# Patient Record
Sex: MALE | Race: WHITE | NOT HISPANIC OR LATINO | Employment: FULL TIME | URBAN - METROPOLITAN AREA
[De-identification: names, ages, dates, MRNs, and addresses within clinical notes are randomized per-mention and may not be internally consistent; named-entity substitution may affect disease eponyms.]

---

## 2017-02-15 ENCOUNTER — TRANSCRIBE ORDERS (OUTPATIENT)
Dept: ADMINISTRATIVE | Facility: HOSPITAL | Age: 47
End: 2017-02-15

## 2017-02-15 DIAGNOSIS — R06.81 APNEA: Primary | ICD-10-CM

## 2017-04-10 ENCOUNTER — HOSPITAL ENCOUNTER (OUTPATIENT)
Dept: SLEEP CENTER | Facility: CLINIC | Age: 47
Discharge: HOME | End: 2017-04-10
Payer: COMMERCIAL

## 2017-04-10 DIAGNOSIS — R06.81 APNEA: ICD-10-CM

## 2017-09-05 ENCOUNTER — APPOINTMENT (EMERGENCY)
Dept: CT IMAGING | Facility: HOSPITAL | Age: 47
End: 2017-09-05
Payer: COMMERCIAL

## 2017-09-05 ENCOUNTER — HOSPITAL ENCOUNTER (EMERGENCY)
Facility: HOSPITAL | Age: 47
Discharge: HOME | End: 2017-09-05
Attending: EMERGENCY MEDICINE | Admitting: EMERGENCY MEDICINE
Payer: COMMERCIAL

## 2017-09-05 VITALS
SYSTOLIC BLOOD PRESSURE: 141 MMHG | TEMPERATURE: 98.1 F | RESPIRATION RATE: 16 BRPM | OXYGEN SATURATION: 98 % | HEART RATE: 57 BPM | DIASTOLIC BLOOD PRESSURE: 94 MMHG

## 2017-09-05 DIAGNOSIS — H53.419 SCOTOMA: Primary | ICD-10-CM

## 2017-09-05 LAB
ALBUMIN SERPL BCP-MCNC: 3.7 G/DL (ref 3.5–5)
ALP SERPL-CCNC: 65 U/L (ref 46–116)
ALT SERPL W P-5'-P-CCNC: 27 U/L (ref 12–78)
ANION GAP SERPL CALCULATED.3IONS-SCNC: 10 MMOL/L (ref 4–13)
APTT PPP: 30 SECONDS (ref 23–35)
AST SERPL W P-5'-P-CCNC: 45 U/L (ref 5–45)
BASOPHILS # BLD AUTO: 0.06 THOUSANDS/ΜL (ref 0–0.1)
BASOPHILS NFR BLD AUTO: 1 % (ref 0–1)
BILIRUB SERPL-MCNC: 0.3 MG/DL (ref 0.2–1)
BUN SERPL-MCNC: 14 MG/DL (ref 5–25)
CALCIUM SERPL-MCNC: 8.6 MG/DL (ref 8.3–10.1)
CHLORIDE SERPL-SCNC: 105 MMOL/L (ref 100–108)
CO2 SERPL-SCNC: 30 MMOL/L (ref 21–32)
CREAT SERPL-MCNC: 0.81 MG/DL (ref 0.6–1.3)
EOSINOPHIL # BLD AUTO: 0.17 THOUSAND/ΜL (ref 0–0.61)
EOSINOPHIL NFR BLD AUTO: 3 % (ref 0–6)
ERYTHROCYTE [DISTWIDTH] IN BLOOD BY AUTOMATED COUNT: 13.3 % (ref 11.6–15.1)
GFR SERPL CREATININE-BSD FRML MDRD: 107 ML/MIN/1.73SQ M
GLUCOSE SERPL-MCNC: 105 MG/DL (ref 65–140)
HCT VFR BLD AUTO: 44.4 % (ref 36.5–49.3)
HGB BLD-MCNC: 14.7 G/DL (ref 12–17)
INR PPP: 0.94 (ref 0.86–1.16)
LYMPHOCYTES # BLD AUTO: 1.92 THOUSANDS/ΜL (ref 0.6–4.47)
LYMPHOCYTES NFR BLD AUTO: 31 % (ref 14–44)
MAGNESIUM SERPL-MCNC: 2.2 MG/DL (ref 1.6–2.6)
MCH RBC QN AUTO: 29.5 PG (ref 26.8–34.3)
MCHC RBC AUTO-ENTMCNC: 33.1 G/DL (ref 31.4–37.4)
MCV RBC AUTO: 89 FL (ref 82–98)
MONOCYTES # BLD AUTO: 0.56 THOUSAND/ΜL (ref 0.17–1.22)
MONOCYTES NFR BLD AUTO: 9 % (ref 4–12)
NEUTROPHILS # BLD AUTO: 3.43 THOUSANDS/ΜL (ref 1.85–7.62)
NEUTS SEG NFR BLD AUTO: 56 % (ref 43–75)
NT-PROBNP SERPL-MCNC: 84 PG/ML
PLATELET # BLD AUTO: 229 THOUSANDS/UL (ref 149–390)
PMV BLD AUTO: 9.7 FL (ref 8.9–12.7)
POTASSIUM SERPL-SCNC: 3.6 MMOL/L (ref 3.5–5.3)
PROT SERPL-MCNC: 7 G/DL (ref 6.4–8.2)
PROTHROMBIN TIME: 12.8 SECONDS (ref 12.1–14.4)
RBC # BLD AUTO: 4.99 MILLION/UL (ref 3.88–5.62)
SODIUM SERPL-SCNC: 145 MMOL/L (ref 136–145)
TROPONIN I SERPL-MCNC: <0.02 NG/ML
WBC # BLD AUTO: 6.14 THOUSAND/UL (ref 4.31–10.16)

## 2017-09-05 PROCEDURE — 99284 EMERGENCY DEPT VISIT MOD MDM: CPT

## 2017-09-05 PROCEDURE — 36415 COLL VENOUS BLD VENIPUNCTURE: CPT | Performed by: EMERGENCY MEDICINE

## 2017-09-05 PROCEDURE — 83735 ASSAY OF MAGNESIUM: CPT | Performed by: EMERGENCY MEDICINE

## 2017-09-05 PROCEDURE — 85025 COMPLETE CBC W/AUTO DIFF WBC: CPT | Performed by: EMERGENCY MEDICINE

## 2017-09-05 PROCEDURE — 85730 THROMBOPLASTIN TIME PARTIAL: CPT | Performed by: EMERGENCY MEDICINE

## 2017-09-05 PROCEDURE — 70450 CT HEAD/BRAIN W/O DYE: CPT

## 2017-09-05 PROCEDURE — 85610 PROTHROMBIN TIME: CPT | Performed by: EMERGENCY MEDICINE

## 2017-09-05 PROCEDURE — 80053 COMPREHEN METABOLIC PANEL: CPT | Performed by: EMERGENCY MEDICINE

## 2017-09-05 PROCEDURE — 93005 ELECTROCARDIOGRAM TRACING: CPT | Performed by: EMERGENCY MEDICINE

## 2017-09-05 PROCEDURE — 84484 ASSAY OF TROPONIN QUANT: CPT | Performed by: EMERGENCY MEDICINE

## 2017-09-05 PROCEDURE — 83880 ASSAY OF NATRIURETIC PEPTIDE: CPT | Performed by: EMERGENCY MEDICINE

## 2017-09-06 LAB
ATRIAL RATE: 55 BPM
P AXIS: 67 DEGREES
PR INTERVAL: 176 MS
QRS AXIS: 2 DEGREES
QRSD INTERVAL: 86 MS
QT INTERVAL: 430 MS
QTC INTERVAL: 411 MS
T WAVE AXIS: 32 DEGREES
VENTRICULAR RATE: 55 BPM

## 2017-12-12 ENCOUNTER — TRANSCRIBE ORDERS (OUTPATIENT)
Dept: ADMINISTRATIVE | Facility: HOSPITAL | Age: 47
End: 2017-12-12

## 2017-12-12 ENCOUNTER — ALLSCRIPTS OFFICE VISIT (OUTPATIENT)
Dept: OTHER | Facility: OTHER | Age: 47
End: 2017-12-12

## 2017-12-12 DIAGNOSIS — H53.419 SCOTOMA INVOLVING CENTRAL AREA IN VISUAL FIELD, UNSPECIFIED LATERALITY: Primary | ICD-10-CM

## 2017-12-12 DIAGNOSIS — H53.419: ICD-10-CM

## 2017-12-29 ENCOUNTER — LAB CONVERSION - ENCOUNTER (OUTPATIENT)
Dept: OTHER | Facility: OTHER | Age: 47
End: 2017-12-29

## 2017-12-29 LAB
C-REACTIVE PROTEIN (HISTORICAL): 1.2 MG/L
ERYTHROCYTE SEDIMENTATION RATE (HISTORICAL): 2 MM/H

## 2018-01-24 VITALS
WEIGHT: 200.56 LBS | RESPIRATION RATE: 16 BRPM | BODY MASS INDEX: 26.58 KG/M2 | HEIGHT: 73 IN | SYSTOLIC BLOOD PRESSURE: 126 MMHG | OXYGEN SATURATION: 97 % | HEART RATE: 67 BPM | DIASTOLIC BLOOD PRESSURE: 78 MMHG

## 2018-04-06 ENCOUNTER — TELEPHONE (OUTPATIENT)
Dept: NEUROLOGY | Facility: CLINIC | Age: 48
End: 2018-04-06

## 2018-04-06 NOTE — TELEPHONE ENCOUNTER
So I am looking at his chart - and actually MRI brain, and CTs have even been done  That's fine he does not need to see me unless he has more concerns/questions

## 2018-04-06 NOTE — TELEPHONE ENCOUNTER
pt called to review his testing that was done in dec   he states that due to insurance it would cost him $500 to have a follow up appt with you   he is asking if you need to see him for follow up or if we can give him the results on the phone  MRI and 2 CT's were done and results in allscripts     [phone] to leave detailed message on machine

## 2018-04-24 ENCOUNTER — TELEPHONE (OUTPATIENT)
Dept: UROLOGY | Facility: HOSPITAL | Age: 48
End: 2018-04-24

## 2018-04-24 NOTE — TELEPHONE ENCOUNTER
Dr Holly Mercedes has an opening tomorrow in [facility] at 6:45 pm   Please offer to patient

## 2018-04-24 NOTE — TELEPHONE ENCOUNTER
Patient accepted appt of 04/25/18 at 6:45PM with Dr Geovanni Horan at [facility] Comment

## 2018-04-24 NOTE — TELEPHONE ENCOUNTER
Please triage  Patient has a lump on his penis and is in significant pain  It has also changed the curvature of the penis  He would like to be seen in [facility]

## 2018-09-14 ENCOUNTER — OFFICE VISIT (OUTPATIENT)
Dept: PULMONOLOGY | Facility: MEDICAL CENTER | Age: 48
End: 2018-09-14
Payer: COMMERCIAL

## 2018-09-14 VITALS
HEART RATE: 57 BPM | TEMPERATURE: 98.2 F | SYSTOLIC BLOOD PRESSURE: 128 MMHG | BODY MASS INDEX: 25.54 KG/M2 | RESPIRATION RATE: 12 BRPM | DIASTOLIC BLOOD PRESSURE: 82 MMHG | WEIGHT: 199 LBS | OXYGEN SATURATION: 98 % | HEIGHT: 74 IN

## 2018-09-14 DIAGNOSIS — G47.33 OSA (OBSTRUCTIVE SLEEP APNEA): Primary | ICD-10-CM

## 2018-09-14 PROCEDURE — 99203 OFFICE O/P NEW LOW 30 MIN: CPT | Performed by: INTERNAL MEDICINE

## 2018-09-14 NOTE — PROGRESS NOTES
Assessment/Plan:     Problem List Items Addressed This Visit        Respiratory    BRIAN (obstructive sleep apnea) - Primary     Upon review of data patient does have slightly elevated AHI using the new machine  Unclear if this is related to machine variation and increased sensitivity of the newer machines  Patient may possibly require an increase in the base pressure  Central apnea index is normal based on compliance download  Periodic mask, tubing, filter replacement every 1-3 months is advised and periodic  approximately every year is advised  Uses CPAP during all sleep periods is discussed  Avoidance of sedatives, supine sleep, alcohol, narcotics in the setting of untreated sleep apnea is discussed  Absolute avoidance of driving while drowsy is also discussed  Avoid weight gain/encourage weight loss  Counseling / Coordination of Care  Total floor / unit time spent today 40  minutes  Greater than 50% of total time was spent with the patient and / or family counseling and / or coordination of care  A description of the counseling / coordination of care: Including but not limited to Discussion with medical equipment representative, obtaining compliance data      All questions are answered to the patient's satisfaction and understanding  He verbalizes understanding  He is encouraged to call with any further questions or concerns  Portions of the record may have been created with voice recognition software  Occasional wrong word or "sound a like" substitutions may have occurred due to the inherent limitations of voice recognition software  Read the chart carefully and recognize, using context, where substitutions have occurred      Electronically Signed by Amy Emerson MD    ______________________________________________________________________    Chief Complaint:   Chief Complaint   Patient presents with    Consult     pt has had machine for  4 yrs  and had sleep studies in [place]   Sleep Apnea     ahI number has changed and pt would like machine checked out   Sleeping Problem     none       Patient ID: Byron Johnson is a 53 y o  y o  male has a past medical history of Scotoma and Sleep apnea  9/14/2018  Patient presents today for initial visit  Diagnosed with BRIAN 5 years due to snoring and witnessed apneas  He has never had daytime sleepiness  Replaced machine recently because humidifier wasn't working  He bought his own machine  4 months ago  Settings are at Auto 8-12 cm water  Has a Dreamstation  Mostly recently his AHI has been elevated- after he got the new machine- 4 5- 10  He humidifier on the machine  Uses wisp mask- no leak  He changes his supplies- regularly  No current symptoms  Hours of sleep around 6 hours  Takes it off and sleeps another 1-2 hours  6-8 pounds increase in weight  Patient denies the use of any narcotics, sedatives  No history of any traumatic brain injury  +postnasal drip  Review of Systems   All other systems reviewed and are negative  Smoking history: He reports that he has never smoked  He has never used smokeless tobacco       There is no immunization history on file for this patient  No current outpatient prescriptions on file  No current facility-administered medications for this visit  Allergies: Patient has no known allergies  Objective:  Vitals:    09/14/18 0808   BP: 128/82   BP Location: Right arm   Patient Position: Sitting   Cuff Size: Large   Pulse: 57   Resp: 12   Temp: 98 2 °F (36 8 °C)   TempSrc: Oral   SpO2: 98%   Weight: 90 3 kg (199 lb)   Height: 6' 2" (1 88 m)   Oxygen Therapy  SpO2: 98 %    Wt Readings from Last 3 Encounters:   09/14/18 90 3 kg (199 lb)   12/12/17 91 kg (200 lb 8 9 oz)   09/03/13 89 4 kg (197 lb)     Body mass index is 25 55 kg/m²  Physical Exam   Constitutional: He is oriented to person, place, and time  He appears well-developed and well-nourished   No distress  HENT:   Head: Normocephalic and atraumatic  Mouth/Throat: Oropharynx is clear and moist  No oropharyngeal exudate  Mallampati 4   Eyes: EOM are normal  Pupils are equal, round, and reactive to light  Neck: Normal range of motion  Neck supple  No tracheal deviation present  No thyromegaly present  Neck circumference 16   Cardiovascular: Normal rate and regular rhythm  No murmur heard  Pulmonary/Chest: Effort normal and breath sounds normal  No respiratory distress  He has no wheezes  He has no rales  He exhibits no tenderness  Abdominal: Soft  Bowel sounds are normal  He exhibits no distension  There is no tenderness  Musculoskeletal: Normal range of motion  He exhibits no edema  Lymphadenopathy:     He has no cervical adenopathy  Neurological: He is alert and oriented to person, place, and time  No cranial nerve deficit  Skin: Skin is warm and dry  He is not diaphoretic  Psychiatric: He has a normal mood and affect  His behavior is normal    Vitals reviewed  Diagnostics:    Patient had diagnostic home sleep study in 2014 that showed AHI of 27/hour  This was performed at the [facility]  He subsequently had a titration study that showed optimal pressure of CPAP 11 cm water  ESS: Total score: 1    Compliance data from both machines are reviewed in detail  Patient does have some central apneas and index is 1 7 on his new machine  Overall AHI is 6 0  This is from data that ranged from 5/8/2018 to 9/14/2018  Minimal leak is noted  Patient has and auto peak pressure of 9 4  Mean pressure of 8   5     On his old machine compliance data from 5/8/2017 to 5/7/2018 patient's AHI on this machine was 3 9/hour  0 sec in leak  Auto CPAP peak pressure of 10 7 cm water  Mean pressure of 8 4 cm water

## 2018-09-14 NOTE — LETTER
September 17, 2018     [address]  [address]  [address] [place] [address]    Patient: Gabby Anderson   YOB: 1970   Date of Visit: 9/14/2018       Dear Dr Palacios Poster: Thank you for referring Alisia Parra to me for evaluation  Below are my notes for this consultation  If you have questions, please do not hesitate to call me  I look forward to following your patient along with you  Sincerely,        Dena Chu MD        CC: No Recipients  Dena Chu MD  9/17/2018 10:54 AM  Sign at close encounter  Assessment/Plan:     Problem List Items Addressed This Visit        Respiratory    BRIAN (obstructive sleep apnea) - Primary     Upon review of data patient does have slightly elevated AHI using the new machine  Unclear if this is related to machine variation and increased sensitivity of the newer machines  Patient may possibly require an increase in the base pressure  Central apnea index is normal based on compliance download  Periodic mask, tubing, filter replacement every 1-3 months is advised and periodic  approximately every year is advised  Uses CPAP during all sleep periods is discussed  Avoidance of sedatives, supine sleep, alcohol, narcotics in the setting of untreated sleep apnea is discussed  Absolute avoidance of driving while drowsy is also discussed  Avoid weight gain/encourage weight loss  Counseling / Coordination of Care  Total floor / unit time spent today 40  minutes  Greater than 50% of total time was spent with the patient and / or family counseling and / or coordination of care  A description of the counseling / coordination of care: Including but not limited to Discussion with medical equipment representative, obtaining compliance data      All questions are answered to the patient's satisfaction and understanding  He verbalizes understanding    He is encouraged to call with any further questions or concerns  Portions of the record may have been created with voice recognition software  Occasional wrong word or "sound a like" substitutions may have occurred due to the inherent limitations of voice recognition software  Read the chart carefully and recognize, using context, where substitutions have occurred  Electronically Signed by Alan Weber MD    ______________________________________________________________________    Chief Complaint:   Chief Complaint   Patient presents with    Consult     pt has had machine for  4 yrs  and had sleep studies in [place]   Sleep Apnea     ahI number has changed and pt would like machine checked out   Sleeping Problem     none       Patient ID: Len Middleton is a 53 y o  y o  male has a past medical history of Scot[place] and Sleep apnea  9/14/2018  Patient presents today for initial visit  Diagnosed with BRIAN 5 years due to snoring and witnessed apneas  He has never had daytime sleepiness  Replaced machine recently because humidifier wasn't working  He bought his own machine  4 months ago  Settings are at Auto 8-12 cm water  Has a Dreamstation  Mostly recently his AHI has been elevated- after he got the new machine- 4 5- 10  He humidifier on the machine  Uses wisp mask- no leak  He changes his supplies- regularly  No current symptoms  Hours of sleep around 6 hours  Takes it off and sleeps another 1-2 hours  6-8 pounds increase in weight  Patient denies the use of any narcotics, sedatives  No history of any traumatic brain injury  +postnasal drip  Review of Systems   All other systems reviewed and are negative  Smoking history: He reports that he has never smoked  He has never used smokeless tobacco       There is no immunization history on file for this patient  No current outpatient prescriptions on file  No current facility-administered medications for this visit  Allergies: Patient has no known allergies      Objective:  Vitals: 09/14/18 0808   BP: 128/82   BP Location: Right arm   Patient Position: Sitting   Cuff Size: Large   Pulse: 57   Resp: 12   Temp: 98 2 °F (36 8 °C)   TempSrc: Oral   SpO2: 98%   Weight: 90 3 kg (199 lb)   Height: 6' 2" (1 88 m)   Oxygen Therapy  SpO2: 98 %    Wt Readings from Last 3 Encounters:   09/14/18 90 3 kg (199 lb)   12/12/17 91 kg (200 lb 8 9 oz)   09/03/13 89 4 kg (197 lb)     Body mass index is 25 55 kg/m²  Physical Exam   Constitutional: He is oriented to person, place, and time  He appears well-developed and well-nourished  No distress  HENT:   Head: Normocephalic and atraumatic  Mouth/Throat: Oropharynx is clear and moist  No oropharyngeal exudate  Mallampati 4   Eyes: EOM are normal  Pupils are equal, round, and reactive to light  Neck: Normal range of motion  Neck supple  No tracheal deviation present  No thyromegaly present  Neck circumference 16   Cardiovascular: Normal rate and regular rhythm  No murmur heard  Pulmonary/Chest: Effort normal and breath sounds normal  No respiratory distress  He has no wheezes  He has no rales  He exhibits no tenderness  Abdominal: Soft  Bowel sounds are normal  He exhibits no distension  There is no tenderness  Musculoskeletal: Normal range of motion  He exhibits no edema  Lymphadenopathy:     He has no cervical adenopathy  Neurological: He is alert and oriented to person, place, and time  No cranial nerve deficit  Skin: Skin is warm and dry  He is not diaphoretic  Psychiatric: He has a normal mood and affect  His behavior is normal    Vitals reviewed  Diagnostics:    Patient had diagnostic home sleep study in 2014 that showed AHI of 27/hour  [facility] was [facility] at [facility]  He subsequently had a titration study that showed optimal pressure of CPAP 11 cm water  ESS: Total score: 1    Compliance data from both machines are reviewed in detail    Patient does have some central apneas and index is 1 7 on his new machine  Overall AHI is 6 0  This is from data that ranged from 5/8/2018 to 9/14/2018  Minimal leak is noted  Patient has and auto peak pressure of 9 4  Mean pressure of 8   5     On his old machine compliance data from 5/8/2017 to 5/7/2018 patient's AHI on this machine was 3 9/hour  0 sec in leak  Auto CPAP peak pressure of 10 7 cm water  Mean pressure of 8 4 cm water

## 2018-09-17 PROBLEM — G47.33 OSA (OBSTRUCTIVE SLEEP APNEA): Status: ACTIVE | Noted: 2018-09-17

## 2018-09-21 ENCOUNTER — TELEPHONE (OUTPATIENT)
Dept: PULMONOLOGY | Facility: MEDICAL CENTER | Age: 48
End: 2018-09-21

## 2018-09-21 NOTE — TELEPHONE ENCOUNTER
Patient calling you back  Said you have the chip for his cpap and was wondering if you were able to get the download    Please call him back

## 2020-01-28 ENCOUNTER — TELEPHONE (OUTPATIENT)
Dept: UROLOGY | Facility: MEDICAL CENTER | Age: 50
End: 2020-01-28

## 2020-01-28 NOTE — TELEPHONE ENCOUNTER
This is a new patient and wants to know if we have a doctor that specialized in Pyeronnies disease  He would go to any office if a certain doctor specializes  Please call patient back at [phone]

## 2020-01-28 NOTE — TELEPHONE ENCOUNTER
Complaint/diagnosis:  New pt peyronie disease  Insurance:  AirPOS ID [id number]  History of Cancer:  no  Previous Urologist:  ervin urlogy  Outside testing/where:  no  what kind of test:  no  Records requested/where:  no  Preferred Location:   [facility]

## 2020-01-28 NOTE — TELEPHONE ENCOUNTER
Called and left message for patient to give office a call back to schedule appointment  When patient calls back, may be scheduled 02/05/20 at any open slot with Dr Johnny Holliday

## 2020-02-05 ENCOUNTER — CONSULT (OUTPATIENT)
Dept: UROLOGY | Facility: MEDICAL CENTER | Age: 50
End: 2020-02-05
Payer: COMMERCIAL

## 2020-02-05 VITALS
BODY MASS INDEX: 25.93 KG/M2 | SYSTOLIC BLOOD PRESSURE: 102 MMHG | DIASTOLIC BLOOD PRESSURE: 76 MMHG | HEIGHT: 74 IN | WEIGHT: 202 LBS | HEART RATE: 64 BPM

## 2020-02-05 DIAGNOSIS — N48.6 PEYRONIE'S DISEASE: Primary | ICD-10-CM

## 2020-02-05 PROCEDURE — 99244 OFF/OP CNSLTJ NEW/EST MOD 40: CPT | Performed by: UROLOGY

## 2020-02-05 NOTE — PROGRESS NOTES
Assessment/Plan:  Peyronie's disease - patient notes a multiple year history of a Peyronie's plaque with the development of penile curvature over the past 18 months with plaque stability in curvature stability attained for the past year  Patient denies pain with erection but does note some tenderness after sexual intercourse in the area plaque  The patient is interested in proceeding with Xiaflex injections and will return for alprostadil injection to induce an artificial erection and measure the angle / degree of curvature as well as the point of maximal concavity  The patient is aware the possibility of hematoma ecchymosis infection penile rupture  He understands that with Xiaflex injections the curvature may worsen as well  No problem-specific Assessment & Plan notes found for this encounter  Diagnoses and all orders for this visit:    Peyronie's disease  -     PROSTAGLANDIN PGE1 INJECTABLE 20 MCG/ML, STANDARD, IJ SOLN; 1 mL by Intracavernosal route once for 1 dose          Subjective:      Patient ID: Maurilio Day is a 51 y o  male  HPI    43-year-old gentleman who notes a multiple year history of a Peyronie's plaque with development of Peyronie's curvature approximately 30-35 degrees dorsally from the distal penile shaft  He notes no sentinel event trauma that may have led to Peyronie's curvature  He presents to discuss treatment  He denies any erectile dysfunction or urinary problems noting no gross hematuria or dysuria in the past   He does note a remote history "chronic prostatitis" with the symptoms having resolved over time  The following portions of the patient's history were reviewed and updated as appropriate: allergies, current medications, past family history, past medical history, past social history, past surgical history and problem list     Review of Systems   All other systems reviewed and are negative          Objective:      /76   Pulse 64   Ht 6' 2" (1 88 m)   Wt 91 6 kg (202 lb)   BMI 25 94 kg/m²          Physical Exam   Constitutional: He is oriented to person, place, and time  He appears well-developed and well-nourished  No distress  HENT:   Head: Atraumatic  Eyes: EOM are normal    Neck: Neck supple  Cardiovascular: Normal rate  Pulmonary/Chest: Effort normal    Abdominal: Soft  Genitourinary:   Genitourinary Comments: Phallus circumcised without cutaneous lesions  There is a Peyronie's plaque in the distal dorsal penile shaft  Testes adnexa and scrotum otherwise normal without masses induration tenderness  Neurological: He is alert and oriented to person, place, and time  Psychiatric: He has a normal mood and affect  His behavior is normal  Judgment and thought content normal    Vitals reviewed

## 2020-02-13 ENCOUNTER — APPOINTMENT (OUTPATIENT)
Dept: RADIOLOGY | Facility: AMBULARY SURGERY CENTER | Age: 50
End: 2020-02-13
Attending: SURGERY
Payer: COMMERCIAL

## 2020-02-13 VITALS
HEIGHT: 74 IN | WEIGHT: 202 LBS | BODY MASS INDEX: 25.93 KG/M2 | HEART RATE: 58 BPM | SYSTOLIC BLOOD PRESSURE: 127 MMHG | DIASTOLIC BLOOD PRESSURE: 76 MMHG

## 2020-02-13 DIAGNOSIS — M25.521 PAIN IN RIGHT ELBOW: ICD-10-CM

## 2020-02-13 DIAGNOSIS — M77.11 LATERAL EPICONDYLITIS OF RIGHT ELBOW: Primary | ICD-10-CM

## 2020-02-13 PROCEDURE — 99203 OFFICE O/P NEW LOW 30 MIN: CPT | Performed by: SURGERY

## 2020-02-13 PROCEDURE — 73080 X-RAY EXAM OF ELBOW: CPT

## 2020-02-13 RX ORDER — MELOXICAM 7.5 MG/1
7.5 TABLET ORAL DAILY
Qty: 30 TABLET | Refills: 2 | OUTPATIENT
Start: 2020-02-13 | End: 2021-08-13

## 2020-02-13 NOTE — PROGRESS NOTES
Giulia NINO  Attending, Orthopaedic Surgery  Hand, Wrist, and Elbow Surgery  yuly [facility]      ORTHOPAEDIC HAND, WRIST, AND ELBOW OFFICE  VISIT       ASSESSMENT/PLAN:      50 yo male with right lateral epicondylitis  Script for therapy given today, recommend he go at least twice a week for the 1st few weeks and continue with exercises at home on a consistent basis  He was also given mobic to take once daily as well as a cockup wrist brace to wear at night  Would like to see him back in about 6 weeks to see how he is doing, discussed that these things can take time and if he continues to note no improvement may consider steroid injection at 1 of his next follow-up visit  The patient verbalized understanding of exam findings and treatment plan  We engaged in the shared decision-making process and treatment options were discussed at length with the patient  Surgical and conservative management discussed today along with risks and benefits  Diagnoses and all orders for this visit:    Pain in right elbow  -     XR elbow 3+ vw right; Future        Follow Up:  Return in about 6 weeks (around 3/26/2020) for Recheck  To Do Next Visit:  Re-evaluation of current issue      General Discussions:  Lateral Epicondylitis: The anatomy and physiology of lateral epicondylitis was discussed with the patient today  Typically, degenerative changes in the extensor carpi radialis brevis muscle occur over time  These degenerative changes appear as tears of the tendon on MRI  This creates pain over the lateral epicondyle (outside part of elbow)  This pain typically is made worse with palm down lifting activities as well as anything that involves strength and stability of the wrist   The pain may radiate from the wrist up to the elbow  At times, the shoulder may be weak as well which can predispose or cause continuation of the problem    Conservative treatment usually cures a majority of patients; however, this may take up to 6-9 months  Conservative treatment options typically include activity modification, therapy for strengthening of the shoulder and elbow, tennis elbow straps, and possible corticosteroid injections  Corticosteroid injections are very effective at relieving pain but do not alter the natural history of this process  Rather, steroid injections decrease the pain temporarily to allow for therapy to take place without discomfort  It was discussed that therapy to prevent recurrence is a "life long" process and that if patient relies on the steroid injection alone without performing therapeutic exercises the risk of recurrence is likely  Typical home regimen includes heat and stretching and resisted wrist extension exercises discussed in the office  Surgery is required in fewer than 10% of patients       ____________________________________________________________________________________________________________________________________________      CHIEF COMPLAINT:  Chief Complaint   Patient presents with    Right Elbow - Pain       SUBJECTIVE:  Kyaw Fernando is a 51y o  year old RHD male who presents for evaluation of right lateral elbow pain that been ongoing for about 4 months  Patient does placed wash but states that his pain came out of nowhere after about 2 months of not playing  Patient notes pain is worse with lifting anything and any rotational movements of the wrist   He denies any numbness or tingling and no previous episodes of this pain  He states the pain is more annoying than severe and he was hoping would just go away but it has not        Pain/symptom timing:  Worse during the day when active  Pain/symptom context:  Worse with activites and work  Pain/symptom modifying factors:  Rest makes better, activities make worse  Pain/symptom associated signs/symptoms: none    Prior treatment   · NSAIDsYes   · Injections No   · Bracing/Orthotics No    Physical Therapy No     I have personally reviewed all the relevant PMH, PSH, SH, FH, Medications and allergies      PAST MEDICAL HISTORY:  Past Medical History:   Diagnosis Date    [place]     Sleep apnea        PAST SURGICAL HISTORY:  History reviewed  No pertinent surgical history  FAMILY HISTORY:  Family History   Problem Relation Age of Onset    No Known Problems Mother     No Known Problems Father     No Known Problems Sister     No Known Problems Brother     No Known Problems Maternal Grandmother     No Known Problems Maternal Grandfather     No Known Problems Paternal Grandmother     No Known Problems Paternal Grandfather        SOCIAL HISTORY:  Social History     Tobacco Use    Smoking status: Never Smoker    Smokeless tobacco: Never Used   Substance Use Topics    Alcohol use: No    Drug use: No       MEDICATIONS:  No current outpatient medications on file  ALLERGIES:  No Known Allergies        REVIEW OF SYSTEMS:  Review of Systems   Constitutional: Negative for chills, fatigue, fever and unexpected weight change  HENT: Negative for congestion, dental problem, facial swelling, hearing loss, sneezing, sore throat, trouble swallowing and voice change  Respiratory: Negative for chest tightness, shortness of breath, wheezing and stridor  Cardiovascular: Negative for chest pain, palpitations and leg swelling  Gastrointestinal: Negative for abdominal pain, diarrhea, nausea and vomiting  Endocrine: Negative for cold intolerance and heat intolerance  Musculoskeletal: Positive for arthralgias and myalgias  Negative for joint swelling and neck pain  Skin: Negative for color change, pallor, rash and wound  Neurological: Negative for tremors, facial asymmetry, speech difficulty, weakness and numbness  Hematological: Does not bruise/bleed easily         VITALS:  Vitals:    02/13/20 1439   BP: 127/76   Pulse: 58       LABS:  HgA1c: No results found for: HGBA1C  BMP:   Lab Results   Component Value Date CALCIUM 8 6 09/05/2017    K 3 6 09/05/2017    CO2 30 09/05/2017     09/05/2017    BUN 14 09/05/2017    CREATININE 0 81 09/05/2017       _____________________________________________________  PHYSICAL EXAMINATION:  General: well developed and well nourished, alert, oriented times 3 and appears comfortable  Psychiatric: Normal  HEENT: Normocephalic, Atraumatic Trachea Midline, No torticollis  Pulmonary: No audible wheezing or respiratory distress   Cardiovascular: No pitting edema, 2+ radial pulse   Skin: No Ulcerations  Neurovascular: Sensation Intact to the Median, Ulnar, Radial Nerve, Motor Intact to the Median, Ulnar, Radial Nerve, Pulses Intact  Musculoskeletal: Normal, except as noted in detailed exam and in HPI  MUSCULOSKELETAL EXAMINATION:  Right Elbow:    No swelling or deformity  Full range of motion with flexion, extension, supination and pronation  Positive tenderness to palpation over the Lateral Epicondyle  Pain with passive flexion of the wrist with elbow fully extended  Pain with resisted wrist extension with elbow fully extended  Pain with resisted forearm supination with the elbow fully extended  Negative tinels over the ulnar nerve at the elbow        ___________________________________________________  STUDIES REVIEWED:  I have personally reviewed AP lateral and oblique radiographs of right elbow which demonstrate no acute fracture or dislocation, no significant degenerative changes, no traction osteophyte formation           PROCEDURES PERFORMED:  Procedures  No Procedures performed today    _____________________________________________________      Scribe Attestation    I,:   Laura Richardson PA-C am acting as a scribe while in the presence of the attending physician :        I,:   Aryan Campos MD personally performed the services described in this documentation    as scribed in my presence :

## 2020-02-18 ENCOUNTER — EVALUATION (OUTPATIENT)
Dept: OCCUPATIONAL THERAPY | Facility: CLINIC | Age: 50
End: 2020-02-18
Payer: COMMERCIAL

## 2020-02-18 DIAGNOSIS — M77.11 LATERAL EPICONDYLITIS OF RIGHT ELBOW: Primary | ICD-10-CM

## 2020-02-18 PROCEDURE — 97165 OT EVAL LOW COMPLEX 30 MIN: CPT | Performed by: OCCUPATIONAL THERAPIST

## 2020-02-18 NOTE — PROGRESS NOTES
OT Evaluation     Today's date: 2020  Patient name: Narciso Haddad  : 1970  MRN: [id number]  Referring provider: Siomara Elise PA-C  Dx:   Encounter Diagnosis     ICD-10-CM    1  Lateral epicondylitis of right elbow M77 11                   Assessment  Assessment details: Carlos A Winter is a RHD male who presents with findings consistent to the referred diagnosis including tenderness at the lateral epicondyle, pain with grasping, weakness, reduced function, and positive provacative testing  See below for a detailed assessment  Impairments: activity intolerance and pain with function    Symptom irritability: moderateUnderstanding of Dx/Px/POC: good   Prognosis: good    Goals  STG: Patient will be compliant with home exercise program in 1 week  STG: Pain will be reduced to a 3/10 during appropriate activity and during therapy in 3 weeks  LTG: Performance in ADLs and IADLS will be improved to prior level of function with the affected extremity within 6 weeks or discharge  LTG: Performance in leisure activity such as squash will be improved to prior level of function with the affected extremity within 6 weeks or discharge  LTG: FOTO score increase by 20 points within 6 weeks or discharge  Plan  Plan details: Treatment to include modalities, manual therapy, PRE's, HEP, and orthotics as appropriate     Patient would benefit from: skilled OT and OT eval  Planned modality interventions: thermotherapy: hydrocollator packs  Planned therapy interventions: home exercise program, joint mobilization, manual therapy, therapeutic exercise, patient education, therapeutic activities, strengthening and stretching  Frequency: 2x week  Duration in visits: [address] beginning date: 2020  Plan of Care expiration date: 2020  Treatment plan discussed with: patient        Subjective Evaluation    History of Present Illness  Mechanism of injury: He states that he has been experiencing right elbow pain for a few months not related to an injury  Pain  At best pain ratin (No pain at rest)  At worst pain ratin    Social Support    Employment status: working (Professor at [employer])  Hand dominance: right    Treatments  Current treatment: occupational therapy  Patient Goals  Patient goals for therapy: decreased pain and increased strength          Objective     Palpation     Right   Tenderness of the extensor carpi radialis brevis  Tenderness     Right Elbow   Tenderness in the lateral epicondyle  Right Wrist/Hand   Tenderness in the common extensor tendon and lateral epicondyle  Neurological Testing     Additional Neurological Details  No reports of numbness and tingling  Active Range of Motion     Right Elbow   Normal active range of motion    Right Wrist   Normal active range of motion    Strength/Myotome Testing     Left Wrist/Hand      (2nd hand position)     Trial 1: 89    Right Wrist/Hand   Normal wrist strength     (2nd hand position)     Trial 1: 95 3    Additional Strength Details  Stress position=34 3 and 4/10 pain     Tests     Right Elbow   Positive Cozen's and Mill's  Right Wrist/Hand   Negative resisted middle finger                Precautions: Universal        Manual              IASTM Lateral ep   10'           Cupping 3'           KT PRN Next visit           Extrinsic Stretching                                  Exercise Diary              Eccentric WE    3# 3x10          Eccentric Supination     hammer 2x10         Wrist Maze             MWM Wrist extension     3#                                                                                                                                                                                                                           HEP: Mill's stretches, Wrist extension eccentric, isometric supination  Issued                 Modalities             MHP Pre  5'           CP post PRN

## 2020-02-21 ENCOUNTER — OFFICE VISIT (OUTPATIENT)
Dept: OCCUPATIONAL THERAPY | Facility: CLINIC | Age: 50
End: 2020-02-21
Payer: COMMERCIAL

## 2020-02-21 DIAGNOSIS — M77.11 LATERAL EPICONDYLITIS OF RIGHT ELBOW: Primary | ICD-10-CM

## 2020-02-21 PROCEDURE — 97110 THERAPEUTIC EXERCISES: CPT | Performed by: OCCUPATIONAL THERAPIST

## 2020-02-21 PROCEDURE — 97140 MANUAL THERAPY 1/> REGIONS: CPT | Performed by: OCCUPATIONAL THERAPIST

## 2020-02-21 NOTE — PROGRESS NOTES
Daily Note     Today's date: 2020  Patient name: Ava Staples  : 1970  MRN: [id number]  Referring provider: Winter Wallace PA-C  Dx:   Encounter Diagnosis     ICD-10-CM    1  Lateral epicondylitis of right elbow M77 11                   Subjective: "That feels fine"      Objective: See treatment diary below      Assessment: Did not perform MWM We - did not elicit pain response, only with supination  Plan: Continue per plan of care        Precautions: Universal        Manual            IASTM Lateral ep   10'  20         Cupping 3'  5         KT PRN Next visit  5         Extrinsic Stretching                                  Exercise Diary            Eccentric WE            Eccentric Supination     hammer 3x10         Wrist Maze             MWM Wrist extension    Pass          eccentric EDC   3x 10 Yellow                                                                                                                                                                                                             HEP: Mill's stretches, Wrist extension eccentric, isometric supination  Issued                 Modalities           MHP Pre  5'  15         CP post PRN

## 2020-02-24 ENCOUNTER — OFFICE VISIT (OUTPATIENT)
Dept: OCCUPATIONAL THERAPY | Facility: CLINIC | Age: 50
End: 2020-02-24
Payer: COMMERCIAL

## 2020-02-24 DIAGNOSIS — M77.11 LATERAL EPICONDYLITIS OF RIGHT ELBOW: Primary | ICD-10-CM

## 2020-02-24 PROCEDURE — 97140 MANUAL THERAPY 1/> REGIONS: CPT | Performed by: OCCUPATIONAL THERAPIST

## 2020-02-24 PROCEDURE — 97110 THERAPEUTIC EXERCISES: CPT | Performed by: OCCUPATIONAL THERAPIST

## 2020-02-24 NOTE — PROGRESS NOTES
Daily Note     Today's date: 2020  Patient name: Rosemary Goodman  : 1970  MRN: [id number]  Referring provider: Ok Han PA-C  Dx:   Encounter Diagnosis     ICD-10-CM    1  Lateral epicondylitis of right elbow M77 11                   Subjective: States increased pain in the elbow after last treatment that lasted two days, now feeling much better however  Objective: See treatment diary below  Assessment: Monitoring PRE resistance with slow advancement as he is c/o latent pain in the elbow  Plan: Continue per plan of care        Precautions: Universal        Manual          IASTM Lateral ep   10'  20  15'       Cupping 3'  5  5'       KT PRN Next visit  5  X       Extrinsic Stretching       Self                           Exercise Diary          Eccentric WE            Eccentric Supination     hammer 3x10  hammer + 1 5# 1x10, hammer only 1x10       Wrist Maze             MWM Wrist extension    Pass          eccentric EDC   3x 10 Yellow  3x10 yellow         Flex bar      Wrist extension Green 3x10                                                                                                                                                                                             HEP: Mill's stretches, Wrist extension eccentric, isometric supination  Issued                 Modalities         MHP Pre  5'  15  5'       CP post PRN

## 2020-02-28 ENCOUNTER — OFFICE VISIT (OUTPATIENT)
Dept: OCCUPATIONAL THERAPY | Facility: CLINIC | Age: 50
End: 2020-02-28
Payer: COMMERCIAL

## 2020-02-28 DIAGNOSIS — M77.11 LATERAL EPICONDYLITIS OF RIGHT ELBOW: Primary | ICD-10-CM

## 2020-02-28 PROCEDURE — 97110 THERAPEUTIC EXERCISES: CPT | Performed by: OCCUPATIONAL THERAPIST

## 2020-02-28 PROCEDURE — 97140 MANUAL THERAPY 1/> REGIONS: CPT | Performed by: OCCUPATIONAL THERAPIST

## 2020-02-28 NOTE — PROGRESS NOTES
Daily Note     Today's date: 2020  Patient name: Leticia Luevano  : 1970  MRN: [id number]  Referring provider: Era Hernandez PA-C  Dx:   Encounter Diagnosis     ICD-10-CM    1  Lateral epicondylitis of right elbow M77 11                   Subjective: "It feels better"      Objective: See treatment diary below      Assessment: Good tolerance, progressing well, 59 pounds in test position today  Plan: Continue per plan of care        Precautions: Universal        Manual        IASTM Lateral ep   10'  20  15'  10     Cupping 3'  5  5'  5     KT PRN Next visit  5  X       Extrinsic Stretching      Arpan Oneill                         Exercise Diary        Eccentric WE            Eccentric Supination     hammer 3x10  hammer + 1 5# 1x10, hammer only 1x10  hammer + 1 5# 1x10, hammer only 1x10     Wrist Maze             MWM Wrist extension    Pass          eccentric EDC   3x 10 Yellow  3x10 yellow   3x10 yellow       Flex bar      Wrist extension Green 3x10  Wrist extension Green 3x10                                                                                                                                                                                           HEP: Mill's stretches, Wrist extension eccentric, isometric supination  Issued                 Modalities       MHP Pre  5'  15  5'  10     CP post PRN

## 2020-03-03 ENCOUNTER — APPOINTMENT (OUTPATIENT)
Dept: OCCUPATIONAL THERAPY | Facility: CLINIC | Age: 50
End: 2020-03-03
Payer: COMMERCIAL

## 2020-03-06 ENCOUNTER — OFFICE VISIT (OUTPATIENT)
Dept: OCCUPATIONAL THERAPY | Facility: CLINIC | Age: 50
End: 2020-03-06
Payer: COMMERCIAL

## 2020-03-06 DIAGNOSIS — M77.11 LATERAL EPICONDYLITIS OF RIGHT ELBOW: Primary | ICD-10-CM

## 2020-03-06 PROCEDURE — 97140 MANUAL THERAPY 1/> REGIONS: CPT | Performed by: OCCUPATIONAL THERAPIST

## 2020-03-06 PROCEDURE — 97110 THERAPEUTIC EXERCISES: CPT | Performed by: OCCUPATIONAL THERAPIST

## 2020-03-06 NOTE — PROGRESS NOTES
Daily Note     Today's date: 3/6/2020  Patient name: Fior Evangelista  : 1970  MRN: [id number]  Referring provider: Oneida Alvarez PA-C  Dx:   Encounter Diagnosis     ICD-10-CM    1  Lateral epicondylitis of right elbow M77 11                   Subjective: "It has really been hurting"      Objective: See treatment diary below  211-280 MHP  840-850 unsupervised  850-905 manual  905-915 TE    Assessment:  Increased pain today  He attributes this to doing too much when it was feeling good  Decreased PRE to all non resistive exercise  Plan: He will attempt to come 1 more time before leaving on vacation        Precautions: Universal        Manual     3/6   IASTM Lateral ep   10'  20  15'  10  10   Cupping 3'  5  5'  5  5   KT PRN Next visit  5  X   X   Extrinsic Stretching                             Exercise Diary     3/6   Eccentric WE            Eccentric Supination     hammer 3x10  hammer + 1 5# 1x10, hammer only 1x10  hammer + 1 5# 1x10, hammer only 1x10     Wrist Maze          10x   MWM Wrist extension    Pass          eccentric EDC   3x 10 Yellow  3x10 yellow   3x10 yellow       Flex bar      Wrist extension Green 3x10  Wrist extension Green 3x10      Wall waking           Tennis ball 5x    keypegs         With elbow extension 1x                                                                                                                                                           HEP: Mill's stretches, Wrist extension eccentric, isometric supination  Issued                 Modalities    3/6   MHP Pre  5'  15  5'  10  10'   CP post PRN

## 2020-03-09 ENCOUNTER — APPOINTMENT (OUTPATIENT)
Dept: OCCUPATIONAL THERAPY | Facility: CLINIC | Age: 50
End: 2020-03-09
Payer: COMMERCIAL

## 2020-03-11 ENCOUNTER — OFFICE VISIT (OUTPATIENT)
Dept: OCCUPATIONAL THERAPY | Facility: CLINIC | Age: 50
End: 2020-03-11
Payer: COMMERCIAL

## 2020-03-11 DIAGNOSIS — M77.11 LATERAL EPICONDYLITIS OF RIGHT ELBOW: Primary | ICD-10-CM

## 2020-03-11 PROCEDURE — 97110 THERAPEUTIC EXERCISES: CPT

## 2020-03-11 NOTE — PROGRESS NOTES
Daily Note     Today's date: 3/11/2020  Patient name: Rosemary Goodman  : 1970  MRN: [id number]  Referring provider: Ok Han PA-C  Dx:   Encounter Diagnosis     ICD-10-CM    1  Lateral epicondylitis of right elbow M77 11                   Subjective: It feels so much better today; but it's up and down  Objective: See treatment diary below  Assessment:  Resumed resistive ex, but decreased the reps ; good tolerance  Less muscle tightness with IASTM  Pt feels good relief with KT and splint during functional activity  Plan:  Cont therapy 1x/week          Precautions: Universal        Manual   3/11  2/21  2/24  2/28  3   IASTM Lateral ep   10'  20  15'  10  10   Cupping 5'  5  5'  5  5   KT PRN applied  5  X   X   Extrinsic Stretching   self in between ex    Megan Amor                               Exercise Diary  3/11  2/21  2/24  2/28  3   Eccentric WE            Eccentric Supination   hammer 1 5# 2x10  hammer 3x10  hammer + 1 5# 1x10, hammer only 1x10  hammer + 1 5# 1x10, hammer only 1x10     Wrist Maze  10x        10x   MWM Wrist extension    Pass          eccentric EDC  Y 3x10 3x 10 Yellow  3x10 yellow   3x10 yellow       Flex bar  green 2x10    Wrist extension Green 3x10  Wrist extension Green 3x10      Wall waking   TB 5x        Tennis ball 5x    keypegs         With elbow extension 1x                                                                                                                                                           HEP: Mill's stretches, Wrist extension eccentric, isometric supination                   Modalities 3/11  2/21  2/24  2/28  3/6   MHP Pre  10'  15  5'  10  10'   CP post PRN

## 2020-03-16 ENCOUNTER — APPOINTMENT (OUTPATIENT)
Dept: OCCUPATIONAL THERAPY | Facility: CLINIC | Age: 50
End: 2020-03-16
Payer: COMMERCIAL

## 2020-03-20 ENCOUNTER — TELEPHONE (OUTPATIENT)
Dept: OBGYN CLINIC | Facility: CLINIC | Age: 50
End: 2020-03-20

## 2020-03-20 NOTE — TELEPHONE ENCOUNTER
Left  for patient regarding appointment Thursday in the setting of [place] outbreak  If he would like to reschedule his appointment for 4-6 weeks from now we would be happy to see him then, if he has concerns or questions he would like to address this week we will see him at his regularly scheduled visit

## 2020-03-23 ENCOUNTER — OFFICE VISIT (OUTPATIENT)
Dept: OCCUPATIONAL THERAPY | Facility: CLINIC | Age: 50
End: 2020-03-23
Payer: COMMERCIAL

## 2020-03-31 NOTE — PROGRESS NOTES
Did not return to therapy after this visit due to covid-19  Patient status relating to their referred diagnosis is unknown after this service date  Refer to treatment notes for progress

## 2021-03-26 DIAGNOSIS — Z23 ENCOUNTER FOR IMMUNIZATION: ICD-10-CM

## 2021-06-08 ENCOUNTER — NURSE TRIAGE (OUTPATIENT)
Dept: OTHER | Facility: OTHER | Age: 51
End: 2021-06-08

## 2021-06-08 NOTE — TELEPHONE ENCOUNTER
Called inquiring about covid tests  Has insurance concerns and opted to seek testing elsewhere before triage      Reason for Disposition   Caller hangs up    Additional Information   Caller hangs up    Protocols used: DIFFICULT CALLER-ADULT-AH, NO CONTACT OR DUPLICATE CONTACT CALL-ADULT-AH

## 2021-08-12 ENCOUNTER — APPOINTMENT (EMERGENCY)
Dept: RADIOLOGY | Facility: HOSPITAL | Age: 51
End: 2021-08-12
Payer: COMMERCIAL

## 2021-08-12 ENCOUNTER — HOSPITAL ENCOUNTER (OUTPATIENT)
Facility: HOSPITAL | Age: 51
Setting detail: OBSERVATION
Discharge: HOME | End: 2021-08-13
Attending: EMERGENCY MEDICINE | Admitting: INTERNAL MEDICINE
Payer: COMMERCIAL

## 2021-08-12 DIAGNOSIS — R06.00 DYSPNEA: ICD-10-CM

## 2021-08-12 DIAGNOSIS — R07.9 CHEST PAIN: Primary | ICD-10-CM

## 2021-08-12 DIAGNOSIS — R94.31 ABNORMAL EKG: ICD-10-CM

## 2021-08-12 PROBLEM — E78.1 HYPERTRIGLYCERIDEMIA: Status: ACTIVE | Noted: 2021-08-12

## 2021-08-12 LAB
ALBUMIN SERPL BCP-MCNC: 4.4 G/DL (ref 3.5–5)
ALP SERPL-CCNC: 78 U/L (ref 46–116)
ALT SERPL W P-5'-P-CCNC: 27 U/L (ref 12–78)
ANION GAP SERPL CALCULATED.3IONS-SCNC: 9 MMOL/L (ref 4–13)
APTT PPP: 25 SECONDS (ref 23–37)
AST SERPL W P-5'-P-CCNC: 15 U/L (ref 5–45)
BASOPHILS # BLD AUTO: 0.07 THOUSANDS/ΜL (ref 0–0.1)
BASOPHILS NFR BLD AUTO: 1 % (ref 0–1)
BILIRUB SERPL-MCNC: 0.3 MG/DL (ref 0.2–1)
BUN SERPL-MCNC: 13 MG/DL (ref 5–25)
CALCIUM SERPL-MCNC: 8.8 MG/DL (ref 8.3–10.1)
CHLORIDE SERPL-SCNC: 105 MMOL/L (ref 100–108)
CHOLEST SERPL-MCNC: 187 MG/DL (ref 50–200)
CO2 SERPL-SCNC: 27 MMOL/L (ref 21–32)
CREAT SERPL-MCNC: 0.9 MG/DL (ref 0.6–1.3)
D DIMER PPP FEU-MCNC: <0.27 UG/ML FEU
EOSINOPHIL # BLD AUTO: 0.12 THOUSAND/ΜL (ref 0–0.61)
EOSINOPHIL NFR BLD AUTO: 2 % (ref 0–6)
ERYTHROCYTE [DISTWIDTH] IN BLOOD BY AUTOMATED COUNT: 13 % (ref 11.6–15.1)
GFR SERPL CREATININE-BSD FRML MDRD: 99 ML/MIN/1.73SQ M
GLUCOSE SERPL-MCNC: 104 MG/DL (ref 65–140)
HCT VFR BLD AUTO: 51.2 % (ref 36.5–49.3)
HDLC SERPL-MCNC: 37 MG/DL
HGB BLD-MCNC: 16.4 G/DL (ref 12–17)
IMM GRANULOCYTES # BLD AUTO: 0.02 THOUSAND/UL (ref 0–0.2)
IMM GRANULOCYTES NFR BLD AUTO: 0 % (ref 0–2)
INR PPP: 0.96 (ref 0.84–1.19)
LYMPHOCYTES # BLD AUTO: 1.74 THOUSANDS/ΜL (ref 0.6–4.47)
LYMPHOCYTES NFR BLD AUTO: 27 % (ref 14–44)
MCH RBC QN AUTO: 29.8 PG (ref 26.8–34.3)
MCHC RBC AUTO-ENTMCNC: 32 G/DL (ref 31.4–37.4)
MCV RBC AUTO: 93 FL (ref 82–98)
MONOCYTES # BLD AUTO: 0.5 THOUSAND/ΜL (ref 0.17–1.22)
MONOCYTES NFR BLD AUTO: 8 % (ref 4–12)
NEUTROPHILS # BLD AUTO: 3.99 THOUSANDS/ΜL (ref 1.85–7.62)
NEUTS SEG NFR BLD AUTO: 62 % (ref 43–75)
NONHDLC SERPL-MCNC: 150 MG/DL
NRBC BLD AUTO-RTO: 0 /100 WBCS
PLATELET # BLD AUTO: 228 THOUSANDS/UL (ref 149–390)
PMV BLD AUTO: 9.5 FL (ref 8.9–12.7)
POTASSIUM SERPL-SCNC: 4.2 MMOL/L (ref 3.5–5.3)
PROT SERPL-MCNC: 8 G/DL (ref 6.4–8.2)
PROTHROMBIN TIME: 12.9 SECONDS (ref 11.6–14.5)
RBC # BLD AUTO: 5.51 MILLION/UL (ref 3.88–5.62)
SARS-COV-2 RNA RESP QL NAA+PROBE: NEGATIVE
SODIUM SERPL-SCNC: 141 MMOL/L (ref 136–145)
TRIGL SERPL-MCNC: 458 MG/DL
TROPONIN I SERPL-MCNC: <0.02 NG/ML
WBC # BLD AUTO: 6.44 THOUSAND/UL (ref 4.31–10.16)

## 2021-08-12 PROCEDURE — 99285 EMERGENCY DEPT VISIT HI MDM: CPT | Performed by: PHYSICIAN ASSISTANT

## 2021-08-12 PROCEDURE — 83036 HEMOGLOBIN GLYCOSYLATED A1C: CPT | Performed by: INTERNAL MEDICINE

## 2021-08-12 PROCEDURE — U0005 INFEC AGEN DETEC AMPLI PROBE: HCPCS | Performed by: PHYSICIAN ASSISTANT

## 2021-08-12 PROCEDURE — 84484 ASSAY OF TROPONIN QUANT: CPT | Performed by: EMERGENCY MEDICINE

## 2021-08-12 PROCEDURE — 85025 COMPLETE CBC W/AUTO DIFF WBC: CPT | Performed by: EMERGENCY MEDICINE

## 2021-08-12 PROCEDURE — 85379 FIBRIN DEGRADATION QUANT: CPT | Performed by: PHYSICIAN ASSISTANT

## 2021-08-12 PROCEDURE — 84484 ASSAY OF TROPONIN QUANT: CPT | Performed by: INTERNAL MEDICINE

## 2021-08-12 PROCEDURE — 80053 COMPREHEN METABOLIC PANEL: CPT | Performed by: EMERGENCY MEDICINE

## 2021-08-12 PROCEDURE — 93005 ELECTROCARDIOGRAM TRACING: CPT

## 2021-08-12 PROCEDURE — 80061 LIPID PANEL: CPT | Performed by: INTERNAL MEDICINE

## 2021-08-12 PROCEDURE — 36415 COLL VENOUS BLD VENIPUNCTURE: CPT

## 2021-08-12 PROCEDURE — U0003 INFECTIOUS AGENT DETECTION BY NUCLEIC ACID (DNA OR RNA); SEVERE ACUTE RESPIRATORY SYNDROME CORONAVIRUS 2 (SARS-COV-2) (CORONAVIRUS DISEASE [COVID-19]), AMPLIFIED PROBE TECHNIQUE, MAKING USE OF HIGH THROUGHPUT TECHNOLOGIES AS DESCRIBED BY CMS-2020-01-R: HCPCS | Performed by: PHYSICIAN ASSISTANT

## 2021-08-12 PROCEDURE — 71045 X-RAY EXAM CHEST 1 VIEW: CPT

## 2021-08-12 PROCEDURE — 99285 EMERGENCY DEPT VISIT HI MDM: CPT

## 2021-08-12 PROCEDURE — 99220 PR INITIAL OBSERVATION CARE/DAY 70 MINUTES: CPT | Performed by: INTERNAL MEDICINE

## 2021-08-12 PROCEDURE — 85610 PROTHROMBIN TIME: CPT | Performed by: PHYSICIAN ASSISTANT

## 2021-08-12 PROCEDURE — 85730 THROMBOPLASTIN TIME PARTIAL: CPT | Performed by: PHYSICIAN ASSISTANT

## 2021-08-12 RX ORDER — ASPIRIN 81 MG/1
324 TABLET, CHEWABLE ORAL ONCE
Status: COMPLETED | OUTPATIENT
Start: 2021-08-12 | End: 2021-08-12

## 2021-08-12 RX ORDER — ONDANSETRON 2 MG/ML
4 INJECTION INTRAMUSCULAR; INTRAVENOUS EVERY 6 HOURS PRN
Status: DISCONTINUED | OUTPATIENT
Start: 2021-08-12 | End: 2021-08-13 | Stop reason: HOSPADM

## 2021-08-12 RX ORDER — ACETAMINOPHEN 325 MG/1
650 TABLET ORAL EVERY 6 HOURS PRN
Status: DISCONTINUED | OUTPATIENT
Start: 2021-08-12 | End: 2021-08-13 | Stop reason: HOSPADM

## 2021-08-12 RX ADMIN — ASPIRIN 324 MG: 81 TABLET, CHEWABLE ORAL at 15:52

## 2021-08-12 NOTE — ASSESSMENT & PLAN NOTE
Patient has history of hypertriglyceridemia  Currently not on any medication      · Obtain lipid panel  · Lifestyle modification  · Follow-up with PCP as an outpatient

## 2021-08-12 NOTE — ASSESSMENT & PLAN NOTE
· Patient has history of sleep apnea  Is currently on CPAP at home  However is not able to use it currently    · Follow-up with pulmonology as an outpatient

## 2021-08-12 NOTE — ASSESSMENT & PLAN NOTE
Patient has has new T-wave inversions in inferior leads  Patient is here with chest pain  Given new onset EKG changes and chest pain, we will observe patient with the next 24 hours to ensure that this is not cardiac related chest pain  · Repeat EKG tomorrow a m    · Troponin x3  · Monitor on tele

## 2021-08-12 NOTE — H&P
[facility] 1970, 49 y o  male MRN: [id number]  Unit/Bed#: ED 14 Encounter: [id number]  Primary Care Provider: Viridiana Bill MD   Date and time admitted to hospital: 8/12/2021  3:17 PM    Chest pain  Assessment & Plan     Likely 2/2 noncardiac causes such as musculoskeletal pain  Patient was lifting heavy weights last week  Patient states that his pain started in his back and radiated was front  More consistent with musculoskeletal as it is reproducible on physical examination  Patient's troponins negative x1  EKG shows T-wave depressions in inferior leads  Plan:    Telemetry to monitor for arrhythmias   Lipid panel and A1c   Patient can receive echocardiogram/stress test as an outpatient   Tylenol p r n  For musculoskeletal pain   Can consider muscle relaxer if pain recurs and does not resolve with Tylenol  EKG abnormalities  Assessment & Plan  Patient has has new T-wave inversions in inferior leads  Patient is here with chest pain  Given new onset EKG changes and chest pain, we will observe patient with the next 24 hours to ensure that this is not cardiac related chest pain  · Repeat EKG tomorrow a m  · Troponin x3  · Monitor on tele    Hypertriglyceridemia  Assessment & Plan  Patient has history of hypertriglyceridemia  Currently not on any medication  · Obtain lipid panel  · Lifestyle modification  · Follow-up with PCP as an outpatient    BRIAN (obstructive sleep apnea)  Assessment & Plan  · Patient has history of sleep apnea  Is currently on CPAP at home  However is not able to use it currently  · Follow-up with pulmonology as an outpatient        VTE Pharmacologic Prophylaxis: VTE Score: 2 Low Risk (Score 0-2) - Encourage Ambulation  Code Status: Level 1 - Full Code   Discussion with family: Updated  (wife) at bedside      Anticipated Length of Stay: Patient will be admitted on an observation basis with an anticipated length of stay of less than 2 midnights secondary to chest pain likely MSK origin   Chief Complaint: chest tightness    History of Present Illness:  Juanita Kelly is a 49 y o  male with a PMH of obstructive sleep apnea, hypertriglyceridemia who presents with chest tightness  Patient states that he recently was applying last week and the following day 10 hour drive  After doing these activities he did not feel like himself the following 2 days  Patient states he fell moderate pain in his back but that was related to zip lining, also had associated shortness of breath  He talked to his general practitioner and she recommended he come to the ED for his complaints of chest tightness  Patient states he has chest tightness in the middle of his chest that is nonradiating and is reproducible on palpation  Patient endorses shortness of breath that he describes as not being able to take a full breath, but no increased work of breathing, or dyspnea exertion  He denies headache, blurred vision, fever, chills, nausea, vomiting, stomach pain, diarrhea, constipation, diaphoresis, difficulty with urination  In the ED he received 324 mg of aspirin  He was noted to have an abnormal EKG although the demonstrated nonspecific changes in lead 3 and AVF  Review of Systems:  Review of Systems   Constitutional: Negative for chills, diaphoresis and fever  HENT: Negative for ear pain and sore throat  Eyes: Negative for pain and visual disturbance  Respiratory: Positive for shortness of breath (with deep breaths)  Negative for cough  Cardiovascular: Positive for chest pain (chest tightness)  Negative for palpitations  Gastrointestinal: Negative for abdominal pain and vomiting  Genitourinary: Negative for dysuria and hematuria  Musculoskeletal: Negative for arthralgias and back pain  Skin: Negative for color change and rash  Neurological: Negative for seizures, syncope and headaches     All other systems reviewed and are negative  Past Medical and Surgical History:   Past Medical History:   Diagnosis Date    Scotoma     Sleep apnea        History reviewed  No pertinent surgical history  Meds/Allergies:  Prior to Admission medications    Medication Sig Start Date End Date Taking? Authorizing Provider   meloxicam (MOBIC) 7 5 mg tablet Take 1 tablet (7 5 mg total) by mouth daily 2/13/20   Stephanie Ponce PA-C   sildenafil (VIAGRA) 100 mg tablet Take 100 mg by mouth 3/4/20 3/4/21  Historical Provider, MD HADDAD have reviewed home medications with patient personally  Allergies: No Known Allergies    Social History:  Marital Status: /Civil Union   Occupation: Professor   Patient Pre-hospital Living Situation: Home  Patient Pre-hospital Level of Mobility: unable to be assessed at time of evaluation  Patient Pre-hospital Diet Restrictions: None  Substance Use History:   Social History     Substance and Sexual Activity   Alcohol Use No     Social History     Tobacco Use   Smoking Status Never Smoker   Smokeless Tobacco Never Used     Social History     Substance and Sexual Activity   Drug Use No       Family History:  Family History   Problem Relation Age of Onset    No Known Problems Mother     No Known Problems Father     No Known Problems Sister     No Known Problems Brother     No Known Problems Maternal Grandmother     No Known Problems Maternal Grandfather     No Known Problems Paternal Grandmother     No Known Problems Paternal Grandfather        Physical Exam:     Vitals:   Blood Pressure: 136/88 (08/12/21 1820)  Pulse: 63 (08/12/21 1820)  Temperature: 98 4 °F (36 9 °C) (08/12/21 1514)  Temp Source: Oral (08/12/21 1514)  Respirations: 16 (08/12/21 1820)  SpO2: 95 % (08/12/21 1820)    Physical Exam  Vitals and nursing note reviewed  Constitutional:       Appearance: He is well-developed  HENT:      Head: Normocephalic and atraumatic     Eyes:      Conjunctiva/sclera: Conjunctivae normal    Cardiovascular:      Rate and Rhythm: Normal rate and regular rhythm  Heart sounds: No murmur heard  Pulmonary:      Effort: Pulmonary effort is normal  No respiratory distress  Breath sounds: Normal breath sounds  Abdominal:      General: There is no distension  Palpations: Abdomen is soft  Tenderness: There is no abdominal tenderness  There is no guarding  Musculoskeletal:      Cervical back: Neck supple  Skin:     General: Skin is warm and dry  Neurological:      Mental Status: He is alert and oriented to person, place, and time  Additional Data:     Lab Results:  Results from last 7 days   Lab Units 08/12/21  1514   WBC Thousand/uL 6 44   HEMOGLOBIN g/dL 16 4   HEMATOCRIT % 51 2*   PLATELETS Thousands/uL 228   NEUTROS PCT % 62   LYMPHS PCT % 27   MONOS PCT % 8   EOS PCT % 2     Results from last 7 days   Lab Units 08/12/21  1514   SODIUM mmol/L 141   POTASSIUM mmol/L 4 2   CHLORIDE mmol/L 105   CO2 mmol/L 27   BUN mg/dL 13   CREATININE mg/dL 0 90   ANION GAP mmol/L 9   CALCIUM mg/dL 8 8   ALBUMIN g/dL 4 4   TOTAL BILIRUBIN mg/dL 0 30   ALK PHOS U/L 78   ALT U/L 27   AST U/L 15   GLUCOSE RANDOM mg/dL 104     Results from last 7 days   Lab Units 08/12/21  1546   INR  0 96                   Imaging: Reviewed radiology reports from this admission including: chest xray  XR chest 1 view portable   ED Interpretation by Mary Pelayo PA-C (08/12 1637)   No acute cardiopulmonary disease          EKG and Other Studies Reviewed on Admission:   · EKG: NSR  HR 82     ** Please Note: This note has been constructed using a voice recognition system   **

## 2021-08-12 NOTE — ED PROVIDER NOTES
History  Chief Complaint   Patient presents with    Shortness of Breath     SOB, back pain and chest pain x5 days     The patient is a 52-year-old male with history of sleep apnea who presents to the emergency department for evaluation of chest pain, back pain and shortness of breath  The patient states that wants declining last week  He states he then had a 9 hour car ride after that  He reports over the next few days, he was feeling sore as he expected he would  , approximately 5 days ago he developed significant pain in his back  He states that he eventually began feeling the discomfort more in his chest   He describes it as a heaviness in his chest   He states he still having some very slight pain in his back as well  He states that he spoke with his doctor, and they ultimately advised that he get evaluated for possible blood clot due to his recent long car trip  The patient does not have a history of blood clots or cardiac disease  He states he was told that his EKG was abnormal during triage  He does report some associated shortness of breath that he describes as it is sometimes difficult to get a good breath  He does not feel short of breath at this moment  He denies any recent leg pain or swelling  He denies any known sick contacts or exposure to COVID-19  He is fully vaccinated against COVID-19  He additionally denies fever, chills, nausea, vomiting, headache, dizziness or lightheadedness  History provided by:  Patient   used: No    Shortness of Breath  Associated symptoms: chest pain    Associated symptoms: no abdominal pain, no cough, no ear pain, no fever, no headaches, no neck pain, no rash, no sore throat, no vomiting and no wheezing        Prior to Admission Medications   Prescriptions Last Dose Informant Patient Reported?  Taking?   meloxicam (MOBIC) 7 5 mg tablet   No No   Sig: Take 1 tablet (7 5 mg total) by mouth daily   sildenafil (VIAGRA) 100 mg tablet   Yes No   Sig: Take 100 mg by mouth      Facility-Administered Medications: None       Past Medical History:   Diagnosis Date    [place]     Sleep apnea        History reviewed  No pertinent surgical history  Family History   Problem Relation Age of Onset    No Known Problems Mother     No Known Problems Father     No Known Problems Sister     No Known Problems Brother     No Known Problems Maternal Grandmother     No Known Problems Maternal Grandfather     No Known Problems Paternal Grandmother     No Known Problems Paternal Grandfather      I have reviewed and agree with the history as documented  E-Cigarette/Vaping     E-Cigarette/Vaping Substances     Social History     Tobacco Use    Smoking status: Never Smoker    Smokeless tobacco: Never Used   Substance Use Topics    Alcohol use: No    Drug use: No       Review of Systems   Constitutional: Negative for chills and fever  HENT: Negative for ear pain and sore throat  Eyes: Negative for redness and visual disturbance  Respiratory: Positive for shortness of breath  Negative for cough and wheezing  Cardiovascular: Positive for chest pain  Gastrointestinal: Negative for abdominal pain, diarrhea, nausea and vomiting  Genitourinary: Negative for dysuria and hematuria  Musculoskeletal: Positive for back pain  Negative for neck pain and neck stiffness  Skin: Negative for color change and rash  Neurological: Negative for dizziness, light-headedness and headaches  All other systems reviewed and are negative  Physical Exam  Physical Exam  Vitals and nursing note reviewed  Constitutional:       General: He is not in acute distress  Appearance: He is well-developed  He is not ill-appearing or toxic-appearing  HENT:      Head: Normocephalic and atraumatic  Eyes:      Pupils: Pupils are equal, round, and reactive to light  Cardiovascular:      Rate and Rhythm: Normal rate and regular rhythm  Pulses: Normal pulses  Heart sounds: Normal heart sounds  Pulmonary:      Effort: Pulmonary effort is normal       Breath sounds: Normal breath sounds  Abdominal:      General: There is no distension  Palpations: Abdomen is soft  Tenderness: There is no abdominal tenderness  There is no guarding or rebound  Musculoskeletal:      Cervical back: Normal range of motion and neck supple  Right lower leg: No edema  Left lower leg: No edema  Skin:     General: Skin is warm and dry  Neurological:      Mental Status: He is alert and oriented to person, place, and time  Vital Signs  ED Triage Vitals   Temperature Pulse Respirations Blood Pressure SpO2   08/12/21 1514 08/12/21 1505 08/12/21 1505 08/12/21 1505 08/12/21 1505   98 4 °F (36 9 °C) 86 18 132/87 96 %      Temp Source Heart Rate Source Patient Position - Orthostatic VS BP Location FiO2 (%)   08/12/21 1514 08/12/21 1620 08/12/21 1620 08/12/21 1620 --   Oral Monitor Sitting Right arm       Pain Score       08/12/21 1621       2           Vitals:    08/12/21 1505 08/12/21 1620 08/12/21 1820   BP: 132/87 135/89 136/88   Pulse: 86 64 63   Patient Position - Orthostatic VS:  Sitting Sitting         Visual Acuity      ED Medications  Medications   ondansetron (ZOFRAN) injection 4 mg (has no administration in time range)   acetaminophen (TYLENOL) tablet 650 mg (has no administration in time range)   aspirin chewable tablet 324 mg (324 mg Oral Given 8/12/21 1552)       Diagnostic Studies  Results Reviewed     Procedure Component Value Units Date/Time    Troponin I [[id number]]     Lab Status: No result Specimen: Blood     Troponin I [[id number]] Collected: 08/12/21 1949    Lab Status:  In process Specimen: Blood from Arm, Left Updated: 08/12/21 1954    Hemoglobin A1C w/ EAG Estimation [[id number]]     Lab Status: No result Specimen: Blood     Lipid panel [[id number]]     Lab Status: No result Specimen: Blood     Novel Coronavirus (Covid-19),PCR [facility] - 2 Hour Stat [[id number]]  (Normal) Collected: 08/12/21 1630    Lab Status: Final result Specimen: Nares from Nasopharyngeal Swab Updated: 08/12/21 1753     SARS-CoV-2 Negative    Narrative: The specimen collection materials, transport medium, and/or testing methodology utilized in the production of these test results have been proven to be reliable in a limited validation with an abbreviated program under the Emergency Utilization Authorization provided by the FDA  Testing reported as "Presumptive positive" will be confirmed with secondary testing to ensure result accuracy  Clinical caution and judgement should be used with the interpretation of these results with consideration of the clinical impression and other laboratory testing  Testing reported as "Positive" or "Negative" has been proven to be accurate according to standard laboratory validation requirements  All testing is performed with control materials showing appropriate reactivity at standard intervals        D-Dimer [[id number]]  (Normal) Collected: 08/12/21 1546    Lab Status: Final result Specimen: Blood from Arm, Left Updated: 08/12/21 1712     D-Dimer, Quant <0 27 ug/ml FEU     Protime-INR [[id number]]  (Normal) Collected: 08/12/21 1546    Lab Status: Final result Specimen: Blood from Arm, Left Updated: 08/12/21 1651     Protime 12 9 seconds      INR 0 96    APTT [[id number]]  (Normal) Collected: 08/12/21 1546    Lab Status: Final result Specimen: Blood from Arm, Left Updated: 08/12/21 1651     PTT 25 seconds     Troponin I [[id number]]  (Normal) Collected: 08/12/21 1514    Lab Status: Final result Specimen: Blood from Arm, Left Updated: 08/12/21 1546     Troponin I <0 02 ng/mL     Comprehensive metabolic panel [[id number]] Collected: 08/12/21 1514    Lab Status: Final result Specimen: Blood from Arm, Left Updated: 08/12/21 1543     Sodium 141 mmol/L      Potassium 4 2 mmol/L      Chloride 105 mmol/L      CO2 27 mmol/L      ANION GAP 9 mmol/L      BUN 13 mg/dL      Creatinine 0 90 mg/dL      Glucose 104 mg/dL      Calcium 8 8 mg/dL      AST 15 U/L      ALT 27 U/L      Alkaline Phosphatase 78 U/L      Total Protein 8 0 g/dL      Albumin 4 4 g/dL      Total Bilirubin 0 30 mg/dL      eGFR 99 ml/min/1 73sq m     Narrative:      [place] guidelines for Chronic Kidney Disease (CKD):     Stage 1 with normal or high GFR (GFR > 90 mL/min/1 73 square meters)    Stage 2 Mild CKD (GFR = 60-89 mL/min/1 73 square meters)    Stage 3A Moderate CKD (GFR = 45-59 mL/min/1 73 square meters)    Stage 3B Moderate CKD (GFR = 30-44 mL/min/1 73 square meters)    Stage 4 Severe CKD (GFR = 15-29 mL/min/1 73 square meters)    Stage 5 End Stage CKD (GFR <15 mL/min/1 73 square meters)  Note: GFR calculation is accurate only with a steady state creatinine    CBC and differential [[id number]]  (Abnormal) Collected: 08/12/21 1514    Lab Status: Final result Specimen: Blood from Arm, Left Updated: 08/12/21 1541     WBC 6 44 Thousand/uL      RBC 5 51 Million/uL      Hemoglobin 16 4 g/dL      Hematocrit 51 2 %      MCV 93 fL      MCH 29 8 pg      MCHC 32 0 g/dL      RDW 13 0 %      MPV 9 5 fL      Platelets 934 Thousands/uL      nRBC 0 /100 WBCs      Neutrophils Relative 62 %      Immat GRANS % 0 %      Lymphocytes Relative 27 %      Monocytes Relative 8 %      Eosinophils Relative 2 %      Basophils Relative 1 %      Neutrophils Absolute 3 99 Thousands/µL      Immature Grans Absolute 0 02 Thousand/uL      Lymphocytes Absolute 1 74 Thousands/µL      Monocytes Absolute 0 50 Thousand/µL      Eosinophils Absolute 0 12 Thousand/µL      Basophils Absolute 0 07 Thousands/µL                  XR chest 1 view portable   ED Interpretation by Moshe Mcnally PA-C (08/12 1637)   No acute cardiopulmonary disease                 Procedures  ECG 12 Lead Documentation Only    Date/Time: 8/12/2021 8:18 PM  Performed by: Moshe Mcnally PA-C  Authorized by:  Moshe Mcnally SIENA     Comments:      Normal sinus rhythm at 83  Normal axis  Inverted T-waves in leads III and aVF  No acute ST-T changes  Inverted T-waves are new since previous  ED Course  ED Course as of Aug 12 2023   Thu Aug 12, 2021   1636 Troponin I: <0 02   1735 D-Dimer, Quant: <0 27   1341 Patient has a heart score of 4  Patient is agreeable to staying for chest pain rule out in the hospital                 HEART Risk Score      Most Recent Value   Heart Score Risk Calculator   History  1 Filed at: 08/12/2021 1754   ECG  1 Filed at: 08/12/2021 1754   Age  1 Filed at: 08/12/2021 1754   Risk Factors  1 Filed at: 08/12/2021 1754   Troponin  0 Filed at: 08/12/2021 1754   HEART Score  4 Filed at: 08/12/2021 1754                      SBIRT 22yo+      Most Recent Value   SBIRT (25 yo +)   In order to provide better care to our patients, we are screening all of our patients for alcohol and drug use  Would it be okay to ask you these screening questions? No Filed at: 08/12/2021 1923        BROOKE Risk Score      Most Recent Value   Age >= 72  0 Filed at: 08/12/2021 1930   Known CAD (stenosis >= 50%)  0 Filed at: 08/12/2021 1930   Recent (<=24 hrs) Service Angina  0 Filed at: 08/12/2021 1930   ST Deviation >= 0 5 mm  0 Filed at: 08/12/2021 1930   3+ CAD Risk Factors (FHx, HTN, HLP, DM, Smoker)  0 Filed at: 08/12/2021 1930   Aspirin Use Past 7 Days  0 Filed at: 08/12/2021 1930   Elevated Cardiac Markers  0 Filed at: 08/12/2021 1930   BROOKE Risk Score (Calculated)  0 Filed at: 08/12/2021 1930                  MDM  Number of Diagnoses or Management Options  Abnormal EKG: new and requires workup  Chest pain: new and requires workup  Dyspnea: new and requires workup  Diagnosis management comments: Patient presents for evaluation of chest pain and shortness of breath that he has been experiencing over the last several days    Patient does report a recent long car trip but otherwise has no risk factors for PE, and he has no reported cardiac history  Differential includes but is not limited to pneumonia versus GERD versus costochondritis versus pericarditis versus PE versus ACS  Labs, imaging and ECG ordered  Labs reviewed with no significant findings  Chest x-ray does not show any acute findings  ECG does show 2 inverted T-waves in inferior leads  This is new from previous  Patient has a heart score of 4  Due to this as well as the new ECG changes, and time recommended admission for observation regarding the chest pain  Patient is agreeable  Case was discussed CONG who agreed to accept the patient under the service of Dr Ladean Kocher  Patient is stable for admission  Amount and/or Complexity of Data Reviewed  Clinical lab tests: ordered and reviewed  Tests in the radiology section of CPT®: reviewed and ordered  Decide to obtain previous medical records or to obtain history from someone other than the patient: yes  Review and summarize past medical records: yes  Discuss the patient with other providers: yes    Risk of Complications, Morbidity, and/or Mortality  Presenting problems: moderate  Diagnostic procedures: moderate  Management options: moderate    Patient Progress  Patient progress: stable      Disposition  Final diagnoses:   Chest pain   Dyspnea   Abnormal EKG     Time reflects when diagnosis was documented in both MDM as applicable and the Disposition within this note     Time User Action Codes Description Comment    8/12/2021  6:43 PM Nino Holland Add [R07 9] Chest pain     8/12/2021  6:43 PM Elizabeth Ross Add [R06 00] Dyspnea     8/12/2021  6:43 PM Nino Holland Add [R94 [id number]] Abnormal EKG       ED Disposition     ED Disposition Condition Date/Time Comment    Admit Stable Thu Aug 12, 2021  6:43 PM Case was discussed with CONG and the patient's admission status was agreed to be Admission Status: observation status to the service of Dr Ladean Kocher           Follow-up Information    None Patient's Medications   Discharge Prescriptions    No medications on file     No discharge procedures on file      PDMP Review     None          ED Provider  Electronically Signed by           Mannie Goldberg, PA-C  08/12/21 2023

## 2021-08-12 NOTE — ASSESSMENT & PLAN NOTE
 Likely 2/2 noncardiac causes such as musculoskeletal pain  Patient was lifting heavy weights last week  Patient states that his pain started in his back and radiated was front  More consistent with musculoskeletal as it is reproducible on physical examination  Patient's troponins negative x1  EKG shows T-wave depressions in inferior leads  Plan:    Telemetry to monitor for arrhythmias   Lipid panel and A1c   Patient can receive echocardiogram/stress test as an outpatient   Tylenol p r n  For musculoskeletal pain   Can consider muscle relaxer if pain recurs and does not resolve with Tylenol

## 2021-08-13 VITALS
OXYGEN SATURATION: 97 % | RESPIRATION RATE: 16 BRPM | DIASTOLIC BLOOD PRESSURE: 71 MMHG | TEMPERATURE: 98.3 F | HEART RATE: 62 BPM | SYSTOLIC BLOOD PRESSURE: 117 MMHG

## 2021-08-13 LAB
ATRIAL RATE: 54 BPM
ATRIAL RATE: 87 BPM
EST. AVERAGE GLUCOSE BLD GHB EST-MCNC: 108 MG/DL
HBA1C MFR BLD: 5.4 %
P AXIS: 42 DEGREES
P AXIS: 71 DEGREES
PR INTERVAL: 142 MS
PR INTERVAL: 162 MS
QRS AXIS: 16 DEGREES
QRS AXIS: 6 DEGREES
QRSD INTERVAL: 80 MS
QRSD INTERVAL: 86 MS
QT INTERVAL: 356 MS
QT INTERVAL: 442 MS
QTC INTERVAL: 419 MS
QTC INTERVAL: 419 MS
T WAVE AXIS: -5 DEGREES
T WAVE AXIS: -9 DEGREES
VENTRICULAR RATE: 54 BPM
VENTRICULAR RATE: 83 BPM

## 2021-08-13 PROCEDURE — 93010 ELECTROCARDIOGRAM REPORT: CPT | Performed by: INTERNAL MEDICINE

## 2021-08-13 PROCEDURE — 99217 PR OBSERVATION CARE DISCHARGE MANAGEMENT: CPT | Performed by: INTERNAL MEDICINE

## 2021-08-13 PROCEDURE — 93005 ELECTROCARDIOGRAM TRACING: CPT

## 2021-08-13 RX ORDER — LIDOCAINE 50 MG/G
1 PATCH TOPICAL DAILY
Status: DISCONTINUED | OUTPATIENT
Start: 2021-08-13 | End: 2021-08-13 | Stop reason: HOSPADM

## 2021-08-13 RX ORDER — LIDOCAINE 50 MG/G
1 PATCH TOPICAL DAILY
Qty: 15 PATCH | Refills: 0 | Status: SHIPPED | OUTPATIENT
Start: 2021-08-13

## 2021-08-13 RX ORDER — ACETAMINOPHEN 325 MG/1
975 TABLET ORAL EVERY 6 HOURS PRN
Refills: 0
Start: 2021-08-13

## 2021-08-13 RX ADMIN — LIDOCAINE 5% 1 PATCH: 700 PATCH TOPICAL at 10:00

## 2021-08-13 NOTE — ASSESSMENT & PLAN NOTE
· Patient has history of sleep apnea  Is currently on CPAP at home  However is not able to use it currently in the hospital due to discomfort     · Follow-up with pulmonology as an outpatient

## 2021-08-13 NOTE — ASSESSMENT & PLAN NOTE
Patient has has new T-wave inversions in inferior leads  Patient is here with chest pain  Given new onset EKG changes and chest pain, we will observe patient with the next 24 hours to ensure that this is not cardiac related chest pain      · Repeat EKG tomorrow a m  showed no concerning findings

## 2021-08-13 NOTE — RESPIRATORY THERAPY NOTE
08/12/21 2300   Respiratory Assessment   Resp Comments attempted to place pt on HS CPAP, states he does not like it and declines the use of it this hosp stay

## 2021-08-13 NOTE — ASSESSMENT & PLAN NOTE
 Likely 2/2 noncardiac causes such as musculoskeletal pain  Patient was lifting heavy weights last week  Patient states that his pain started in his back and radiated was front  More consistent with musculoskeletal as it is reproducible on physical examination  Patient's troponins negative x1  EKG shows T-wave depressions in inferior leads       Plan:    Patient discharge home with lidocaine patch and Tylenol PRN for musculoskeletal pain    Patient can receive echocardiogram/stress test as an outpatient

## 2021-08-13 NOTE — DISCHARGE INSTR - AVS FIRST PAGE
Dear Dulce Lancaster,     It was our pleasure to care for you here at [place], [facility]  It is our hope that we were always able to exceed the expected standards for your care during your stay  You were hospitalized due to chest pain  You were cared for in the ED by Cristian River MD under the service of No att  providers found with the [facility] who covers for your primary care physician (PCP), Minnie Glover MD, while you were hospitalized  If you have any questions or concerns related to this hospitalization, you may contact us at [phone]  For follow up as well as any medication refills, we recommend that you follow up with your primary care physician  A registered nurse will reach out to you by phone within a few days after your discharge to answer any additional questions that you may have after going home  However, at this time we provide for you here, the most important instructions / recommendations at discharge:     · Notable Medication Adjustments -   · Lidocaine patch as needed for chest pain   · Testing Required after Discharge -   · None  · Important follow up information -   · Please follow up with your primary care provider in one week and talked to them about arranging a stress test outpatient  · Please review this entire after visit summary as additional general instructions including medication list, appointments, activity, diet, any pertinent wound care, and other additional recommendations from your care team that may be provided for you        Sincerely,     Cristian River MD

## 2021-08-13 NOTE — DISCHARGE INSTRUCTIONS

## 2021-08-13 NOTE — DISCHARGE SUMMARY
[facility]  Discharge- Timur Duque 1970, 49 y o  male MRN: [id number]  Unit/Bed#: ED 14 Encounter: [id number]  Primary Care Provider: Sage Vaughn MD   Date and time admitted to hospital: 8/12/2021  3:17 PM    * Chest pain  Assessment & Plan     Likely 2/2 noncardiac causes such as musculoskeletal pain  Patient was lifting heavy weights last week  Patient states that his pain started in his back and radiated was front  More consistent with musculoskeletal as it is reproducible on physical examination  Patient's troponins negative x1  EKG shows T-wave depressions in inferior leads  Plan:    Patient discharge home with lidocaine patch and Tylenol PRN for musculoskeletal pain    Patient can receive echocardiogram/stress test as an outpatient          EKG abnormalities  Assessment & Plan  Patient has has new T-wave inversions in inferior leads  Patient is here with chest pain  Given new onset EKG changes and chest pain, we will observe patient with the next 24 hours to ensure that this is not cardiac related chest pain  · Repeat EKG tomorrow a m  showed no concerning findings    Hypertriglyceridemia  Assessment & Plan  Patient has history of hypertriglyceridemia  Currently not on any medication  · Lifestyle modification  · Follow-up with PCP as an outpatient    BRIAN (obstructive sleep apnea)  Assessment & Plan  · Patient has history of sleep apnea  Is currently on CPAP at home  However is not able to use it currently in the hospital due to discomfort     · Follow-up with pulmonology as an outpatient        Medical Problems     Resolved Problems  Date Reviewed: 2/18/2020    None              Discharging Resident: Sofya Hardy MD  Discharging Attending: No att  providers found  PCP: Sage Vaughn MD  Admission Date:   Admission Orders (From admission, onward)     Ordered        08/12/21 1844  Place in Observation  Once                   Discharge Date: 08/13/21    Consultations During Hospital Stay:  · None    Procedures Performed:   · None    Significant Findings / Test Results:   · None    Incidental Findings:   · None     Test Results Pending at Discharge (will require follow up): · None     Outpatient Tests Requested:  · None    Complications:  None    Reason for Admission: Chest pain     Hospital Course:   Anahi Mccauley is a 49 y o  male patient who originally presented to the hospital on 8/12/2021 due to chest tightness  Patient states that he recently was applying last week and the following day 10 hour drive  After doing these activities he did not feel like himself the following 2 days  Patient states he fell moderate pain in his back but that was related to zip lining, also had associated shortness of breath  He talked to his general practitioner and she recommended he come to the ED for his complaints of chest tightness  Patient states he has chest tightness in the middle of his chest that is nonradiating and is reproducible on palpation  In the ED he received 324 mg of aspirin  He was noted to have an abnormal EKG although the demonstrated nonspecific changes in lead 3 and AVF  His repeat EKG the following day did not show any concerning findings  He was given a lidocaine patch and tylenol for musculoskeletal pain  Today he is medically stable for discharge  Please see above list of diagnoses and related plan for additional information  Condition at Discharge: stable    Discharge Day Visit / Exam:   Subjective:  No acute events overnight  Vitals: Blood Pressure: 117/71 (08/13/21 0957)  Pulse: 62 (08/13/21 0957)  Temperature: 98 3 °F (36 8 °C) (08/13/21 0957)  Temp Source: Oral (08/13/21 0957)  Respirations: 16 (08/13/21 0957)  SpO2: 97 % (08/13/21 0957)  Exam:   Physical Exam  Vitals and nursing note reviewed  Constitutional:       Appearance: He is well-developed  HENT:      Head: Normocephalic and atraumatic     Eyes: Conjunctiva/sclera: Conjunctivae normal    Cardiovascular:      Rate and Rhythm: Normal rate and regular rhythm  Heart sounds: No murmur heard  Pulmonary:      Effort: Pulmonary effort is normal  No respiratory distress  Breath sounds: Normal breath sounds  Abdominal:      General: There is no distension  Palpations: Abdomen is soft  Tenderness: There is no abdominal tenderness  There is no guarding  Musculoskeletal:      Cervical back: Neck supple  Skin:     General: Skin is warm and dry  Neurological:      Mental Status: He is alert and oriented to person, place, and time  Discussion with Family: Updated  (wife) via phone  Discharge instructions/Information to patient and family:   See after visit summary for information provided to patient and family  Provisions for Follow-Up Care:  See after visit summary for information related to follow-up care and any pertinent home health orders  Disposition:   Home    Planned Readmission: None    Discharge Medications:  See after visit summary for reconciled discharge medications provided to patient and/or family        **Please Note: This note may have been constructed using a voice recognition system**

## 2021-08-13 NOTE — UTILIZATION REVIEW
Initial Clinical Review    Admission: Date/Time/Statement:   Admission Orders (From admission, onward)     Ordered        08/12/21 1844  Place in Observation  Once                   Orders Placed This Encounter   Procedures    Place in Observation     Standing Status:   Standing     Number of Occurrences:   1     Order Specific Question:   Level of Care     Answer:   Med Surg [16]     ED Arrival Information     Expected Arrival Acuity    - 8/12/2021 14:18 Urgent         Means of arrival Escorted by Service Admission type    Walk-In Self Hospitalist Urgent         Arrival complaint    SOB         Chief Complaint   Patient presents with    Shortness of Breath     SOB, back pain and chest pain x5 days       Initial Presentation: 52-year-old male with history of obstructive sleep apnea presenting to the ED with chest pain  Pt given ASA in ED  Reports chest pain has not completely resolved  On exam, reproducible chest wall tenderness over the left sternal border area  Initial troponin neg  Nonspecific T-wave changes noted in inferior leads on EKG  BROOKE 0   Pt admitted as OBS to telemetry with chest pain, suspect MSK   Plan -telemetry, trend troponin , ECG 8/13, lipid panel and A11c, tylenol prn for pain, consider muscle relaxer, [facility] at [facility]        ED Triage Vitals   Temperature Pulse Respirations Blood Pressure SpO2   08/12/21 1514 08/12/21 1505 08/12/21 1505 08/12/21 1505 08/12/21 1505   98 4 °F (36 9 °C) 86 18 132/87 96 %      Temp Source Heart Rate Source Patient Position - Orthostatic VS BP Location FiO2 (%)   08/12/21 1514 08/12/21 1620 08/12/21 1620 08/12/21 1620 --   Oral Monitor Sitting Right arm       Pain Score       08/12/21 1621       2          Wt Readings from Last 1 Encounters:   02/13/20 91 6 kg (202 lb)     Additional Vital Signs:   Date/Time  Temp  Pulse  Resp  BP  MAP (mmHg)  SpO2    08/13/21 0600  --  50Abnormal   16  118/80  94  95 %    08/13/21 0445  --  54Abnormal   --  --  --  94 %    08/13/21 0400  --  46Abnormal   16  125/70  92  94 %    08/13/21 0200  --  50Abnormal   16  122/75  94  94 %    08/12/21 2131  --  58  16  132/58  --  95 %    08/12/21 1820  --  63  16  136/88  107  95 %    08/12/21 1620  --  64  16  135/89  107  98 %        Pertinent Labs/Diagnostic Test Results:   8/12  ECGNormal sinus rhythm  Possible Left atrial enlargement  Inferior infarct , age undetermined   CXR-No acute cardiopulmonary findings    8/13 ECG-Sinus bradycardia    Results from last 7 days   Lab Units 08/12/21  1630   SARS-COV-2  Negative     Results from last 7 days   Lab Units 08/12/21  1514   WBC Thousand/uL 6 44   HEMOGLOBIN g/dL 16 4   HEMATOCRIT % 51 2*   PLATELETS Thousands/uL 228   NEUTROS ABS Thousands/µL 3 99         Results from last 7 days   Lab Units 08/12/21  1514   SODIUM mmol/L 141   POTASSIUM mmol/L 4 2   CHLORIDE mmol/L 105   CO2 mmol/L 27   ANION GAP mmol/L 9   BUN mg/dL 13   CREATININE mg/dL 0 90   EGFR ml/min/1 73sq m 99   CALCIUM mg/dL 8 8     Results from last 7 days   Lab Units 08/12/21  1514   AST U/L 15   ALT U/L 27   ALK PHOS U/L 78   TOTAL PROTEIN g/dL 8 0   ALBUMIN g/dL 4 4   TOTAL BILIRUBIN mg/dL 0 30         Results from last 7 days   Lab Units 08/12/21  1514   GLUCOSE RANDOM mg/dL 104               Results from last 7 days   Lab Units 08/12/21  2223 08/12/21  1949 08/12/21  1514   TROPONIN I ng/mL <0 02 <0 02 <0 02     Results from last 7 days   Lab Units 08/12/21  1546   D-DIMER QUANTITATIVE ug/ml FEU <0 27     Results from last 7 days   Lab Units 08/12/21  1546   PROTIME seconds 12 9   INR  0 96   PTT seconds 25         ED Treatment:   Medication Administration from 08/12/2021 1418 to 08/13/2021 0934       Date/Time Order Dose Route Action     08/12/2021 1552 aspirin chewable tablet 324 mg 324 mg Oral Given        Past Medical History:   Diagnosis Date    [place]     Sleep apnea      Present on Admission:   BRIAN (obstructive sleep apnea)      Admitting Diagnosis: SOB (shortness of breath) [R06 02]  Age/Sex: 49 y o  male  Admission Orders:  Scheduled Medications:  lidocaine, 1 patch, Topical, Daily      Continuous IV Infusions:     PRN Meds:  acetaminophen, 650 mg, Oral, Q6H PRN  ondansetron, 4 mg, Intravenous, Q6H PRN    Ambulate QID  Telemetry  CPap at [facility] Utilization Review Department  ATTENTION: Please call with any questions or concerns to [phone] and carefully listen to the prompts so that you are directed to the right person  All voicemails are confidential   Avelina Cowden all requests for admission clinical reviews, approved or denied determinations and any other requests to dedicated fax number below belonging to the campus where the patient is receiving treatment   List of dedicated fax numbers for the Facilities:  [address] DENIALS (Administrative/Medical Necessity) [phone]   [address] (Maternity/NICU/Pediatrics) [phone]   [address] [place] [address] [id number] [address] [place] [address] [facility] [facility] [phone]

## 2021-08-13 NOTE — ASSESSMENT & PLAN NOTE
Patient has history of hypertriglyceridemia  Currently not on any medication      · Lifestyle modification  · Follow-up with PCP as an outpatient

## 2021-08-19 ENCOUNTER — OFFICE VISIT (OUTPATIENT)
Dept: PHYSICAL THERAPY | Facility: CLINIC | Age: 51
End: 2021-08-19
Payer: COMMERCIAL

## 2021-08-19 ENCOUNTER — HOSPITAL ENCOUNTER (OUTPATIENT)
Dept: NON INVASIVE DIAGNOSTICS | Facility: HOSPITAL | Age: 51
Discharge: HOME | End: 2021-08-19
Payer: COMMERCIAL

## 2021-08-19 DIAGNOSIS — R07.9 LEFT-SIDED CHEST PAIN: ICD-10-CM

## 2021-08-19 DIAGNOSIS — M25.552 LEFT HIP PAIN: Primary | ICD-10-CM

## 2021-08-19 DIAGNOSIS — R07.9 CHEST PAIN: ICD-10-CM

## 2021-08-19 PROCEDURE — 97162 PT EVAL MOD COMPLEX 30 MIN: CPT | Performed by: PHYSICAL THERAPIST

## 2021-08-19 PROCEDURE — 93018 CV STRESS TEST I&R ONLY: CPT | Performed by: INTERNAL MEDICINE

## 2021-08-19 PROCEDURE — 93016 CV STRESS TEST SUPVJ ONLY: CPT | Performed by: INTERNAL MEDICINE

## 2021-08-19 PROCEDURE — 93017 CV STRESS TEST TRACING ONLY: CPT

## 2021-08-19 NOTE — PROGRESS NOTES
PT Evaluation     Today's date: 2021  Patient name: Anahi Mccauley  : 1970  MRN: [id number]  Referring provider: Ally Plata PT  Dx:   Encounter Diagnosis     ICD-10-CM    1  Left hip pain  M25 552    2  Left-sided chest pain  R07 9                   Assessment  Assessment details: Anahi Mccauley is a 49 y o  male who presents to the clinic through direct access with complaints of left hip pain as well as left pectoralis/chest pain  The patient presents with the above listed impairments  He is tender to palpation at the insertion of the pec major on the left  Patient is also positive with a FADIR on his left hip  Patient has signs and symptoms that are possibly consistent with a FREDO on the left  He is eager to decrease his overall pain and should benefit from skilled physical therapy    Thank you for the referral       Impairments: abnormal muscle firing, abnormal or restricted ROM, activity intolerance, impaired physical strength, lacks appropriate home exercise program, pain with function, poor posture  and poor body mechanics  Understanding of Dx/Px/POC: good   Prognosis: good    Goals  Impairment Goals  - Decrease pain by 50% in 4 weeks  - Increase hip IR by at least 5 degrees in 4 weeks    Functional Goals  - Return to Prior Level of Function in 4 weeks  - Patient will be independent with HEP in 4 weeks  - Patient will be able to sit with his legs crossed with decreased pain in 4 weeks  - Patient will be able to take a deep breath with decreased pain in 4 weeks      Plan  Patient would benefit from: skilled physical therapy  Planned modality interventions: cryotherapy, thermotherapy: hydrocollator packs and TENS  Planned therapy interventions: home exercise program, graded exercise, functional ROM exercises, flexibility, body mechanics training, postural training, patient education, therapeutic activities, therapeutic exercise, manual therapy, joint mobilization and neuromuscular re-education  Frequency: 2x week  Duration in weeks: 4  Treatment plan discussed with: patient        Subjective Evaluation    History of Present Illness  Mechanism of injury: HPI:  Patient notes that he feels that his hip gets less mobile as he ambulates longer  He notes that he can get his hip to "crack", but it will feel better  He cannot sit in a butterfly position due to decreased mobility in his hip  He notes that this has been ongoing for several months  Patient also notes that he woke up with back pain and chest pain a week ago  He notes that he had a strenuous work ~2 weeks prior but noted back and chest pain to the left side in 1 weeks ago  He notes that he feels it is more muscular pain in his chest       Pain Location:  L hip, L chest  Occupation:  Professor at [employer]    Prior Functional Limitations: Independent prior   AGG:  Prolonged ambulation, sitting in butterfly position, notes chest pain that gradually increases throughout the day   Ease:  Cracking / popping hip  Patient Goals:  "I'd like to not hurt"    Pain  Current pain ratin (4/10 for chest)  At best pain ratin (1/10 for chest)  At worst pain rating: 3 (5/10 for chest)  Quality: tight, dull ache and sharp          Objective     Active Range of Motion   Left Hip   External rotation (90/90): 32 degrees with pain  Internal rotation (90/90): 29 degrees with pain    Right Hip   External rotation (90/90): 40 degrees   Internal rotation (90/90): 30 degrees     Strength/Myotome Testing     Left Hip   Planes of Motion   Flexion: 5  Extension: 4  Abduction: 4  External rotation: 4  Internal rotation: 4    Right Hip   Normal muscle strength    Tests     Left Hip   Positive FADIR       General Comments:      Cervical/Thoracic Comments  Patient tender to palpation pec major insertion  Some tightness to pec minor   Notes discomfort with coughing/deep breathing              Diagnosis:    Precautions:    Manuals        PROM        Mobs IASTM                        There Ex        Bike        Doorway Stretch        Single arm doorway        Open Books                         Neruo Re-Ed        Bridge        Cristal        SL Hip ABD        Leg Press                         Rows        Hitchers                                                       Ther Act                                         Modalities             CP PRN

## 2021-08-24 LAB
CHEST PAIN STATEMENT: NORMAL
MAX DIASTOLIC BP: 90 MMHG
MAX HEART RATE: 160 BPM
MAX PREDICTED HEART RATE: 170 BPM
MAX. SYSTOLIC BP: 170 MMHG
PROTOCOL NAME: NORMAL
TARGET HR FORMULA: NORMAL
TEST INDICATION: NORMAL
TIME IN EXERCISE PHASE: NORMAL

## 2021-08-25 ENCOUNTER — OFFICE VISIT (OUTPATIENT)
Dept: PHYSICAL THERAPY | Facility: CLINIC | Age: 51
End: 2021-08-25
Payer: COMMERCIAL

## 2021-08-25 DIAGNOSIS — R07.9 LEFT-SIDED CHEST PAIN: ICD-10-CM

## 2021-08-25 DIAGNOSIS — M25.552 LEFT HIP PAIN: Primary | ICD-10-CM

## 2021-08-25 PROCEDURE — 97112 NEUROMUSCULAR REEDUCATION: CPT | Performed by: PHYSICAL THERAPIST

## 2021-08-25 PROCEDURE — 97140 MANUAL THERAPY 1/> REGIONS: CPT | Performed by: PHYSICAL THERAPIST

## 2021-08-25 PROCEDURE — 97110 THERAPEUTIC EXERCISES: CPT | Performed by: PHYSICAL THERAPIST

## 2021-08-25 NOTE — PROGRESS NOTES
Daily Note     Today's date: 2021  Patient name: Gustavo Farrell  : 1970  MRN: [id number]  Referring provider: Navin Matta, PT  Dx:   Encounter Diagnosis     ICD-10-CM    1  Left hip pain  M25 552    2  Left-sided chest pain  R07 9                   Subjective: "My chest pain went away the day after  I still feel it in my hip  It feels like it's just not moving and it needs to crack"      Objective: See treatment diary below      Assessment: Tolerated treatment well  Patient would benefit from continued PT  Patient notes full resolution of his left anterior chest pain  Noted increased pain in his left hip today  Patient with signs leading to a potential thought of FREDO  Notes pain with IR  All hip PREs today increased pain  Patient will be seeing his PCP tomorrow  Progress as able  Plan: Progress treatment as tolerated         Diagnosis:    Precautions:    Manuals        PROM        Mobs Lateral hip belt mobs        IASTM                        There Ex        Bike        Doorway Stretch        Single arm doorway        Open Books         Hamstring Stretch 20" x 5       Adductor Stretch Standing x3 - pain       Neruo Re-Ed        Bridge        Clamshells Green 1x10 & 1x8 - pain       SL Hip ABD x2 - pain       Leg Press                         Rows        Hitchers                                                       Ther Act                                         Modalities             CP PRN

## 2021-08-27 ENCOUNTER — APPOINTMENT (OUTPATIENT)
Dept: PHYSICAL THERAPY | Facility: CLINIC | Age: 51
End: 2021-08-27
Payer: COMMERCIAL

## 2021-08-31 ENCOUNTER — APPOINTMENT (OUTPATIENT)
Dept: PHYSICAL THERAPY | Facility: CLINIC | Age: 51
End: 2021-08-31
Payer: COMMERCIAL

## 2021-09-13 NOTE — PROGRESS NOTES
PT Discharge    Today's date: 2021  Patient name: Gretta Parkinson  : 1970  MRN: [id number]  Referring provider: Claude Khat, PT  Dx:   Encounter Diagnosis     ICD-10-CM    1  Left hip pain  M25 552    2  Left-sided chest pain  R07 9      Patient went back to his PCP for further follow-up due to his pain  He will be discharged from PT services at this time  If Ly Lovell were to need physical therapy in the future, we would be happy to share in his care

## 2021-09-22 ENCOUNTER — TELEPHONE (OUTPATIENT)
Dept: SLEEP CENTER | Facility: CLINIC | Age: 51
End: 2021-09-22

## 2021-10-20 ENCOUNTER — HOSPITAL ENCOUNTER (EMERGENCY)
Facility: HOSPITAL | Age: 51
Discharge: HOME | End: 2021-10-21
Attending: EMERGENCY MEDICINE
Payer: COMMERCIAL

## 2021-10-20 ENCOUNTER — APPOINTMENT (EMERGENCY)
Dept: CT IMAGING | Facility: HOSPITAL | Age: 51
End: 2021-10-20
Payer: COMMERCIAL

## 2021-10-20 ENCOUNTER — APPOINTMENT (EMERGENCY)
Dept: RADIOLOGY | Facility: HOSPITAL | Age: 51
End: 2021-10-20
Payer: COMMERCIAL

## 2021-10-20 DIAGNOSIS — R10.84 ACUTE GENERALIZED ABDOMINAL PAIN: ICD-10-CM

## 2021-10-20 DIAGNOSIS — K59.00 CONSTIPATION: ICD-10-CM

## 2021-10-20 DIAGNOSIS — R11.0 NAUSEA: Primary | ICD-10-CM

## 2021-10-20 LAB
ALBUMIN SERPL BCP-MCNC: 4.1 G/DL (ref 3.5–5)
ALP SERPL-CCNC: 63 U/L (ref 46–116)
ALT SERPL W P-5'-P-CCNC: 23 U/L (ref 12–78)
ANION GAP SERPL CALCULATED.3IONS-SCNC: 10 MMOL/L (ref 4–13)
APTT PPP: 30 SECONDS (ref 23–37)
AST SERPL W P-5'-P-CCNC: 16 U/L (ref 5–45)
BASOPHILS # BLD AUTO: 0.05 THOUSANDS/ΜL (ref 0–0.1)
BASOPHILS NFR BLD AUTO: 1 % (ref 0–1)
BILIRUB SERPL-MCNC: 0.45 MG/DL (ref 0.2–1)
BILIRUB UR QL STRIP: NEGATIVE
BUN SERPL-MCNC: 12 MG/DL (ref 5–25)
CALCIUM SERPL-MCNC: 8.7 MG/DL (ref 8.3–10.1)
CHLORIDE SERPL-SCNC: 106 MMOL/L (ref 100–108)
CLARITY UR: NORMAL
CO2 SERPL-SCNC: 28 MMOL/L (ref 21–32)
COLOR UR: NORMAL
CREAT SERPL-MCNC: 0.85 MG/DL (ref 0.6–1.3)
EOSINOPHIL # BLD AUTO: 0.08 THOUSAND/ΜL (ref 0–0.61)
EOSINOPHIL NFR BLD AUTO: 1 % (ref 0–6)
ERYTHROCYTE [DISTWIDTH] IN BLOOD BY AUTOMATED COUNT: 13.2 % (ref 11.6–15.1)
GFR SERPL CREATININE-BSD FRML MDRD: 102 ML/MIN/1.73SQ M
GLUCOSE SERPL-MCNC: 94 MG/DL (ref 65–140)
GLUCOSE UR STRIP-MCNC: NEGATIVE MG/DL
HCT VFR BLD AUTO: 43.4 % (ref 36.5–49.3)
HGB BLD-MCNC: 14.3 G/DL (ref 12–17)
HGB UR QL STRIP.AUTO: NEGATIVE
IMM GRANULOCYTES # BLD AUTO: 0.02 THOUSAND/UL (ref 0–0.2)
IMM GRANULOCYTES NFR BLD AUTO: 0 % (ref 0–2)
INR PPP: 1.03 (ref 0.84–1.19)
KETONES UR STRIP-MCNC: NEGATIVE MG/DL
LEUKOCYTE ESTERASE UR QL STRIP: NEGATIVE
LIPASE SERPL-CCNC: 99 U/L (ref 73–393)
LYMPHOCYTES # BLD AUTO: 2.4 THOUSANDS/ΜL (ref 0.6–4.47)
LYMPHOCYTES NFR BLD AUTO: 32 % (ref 14–44)
MCH RBC QN AUTO: 29.7 PG (ref 26.8–34.3)
MCHC RBC AUTO-ENTMCNC: 32.9 G/DL (ref 31.4–37.4)
MCV RBC AUTO: 90 FL (ref 82–98)
MONOCYTES # BLD AUTO: 0.65 THOUSAND/ΜL (ref 0.17–1.22)
MONOCYTES NFR BLD AUTO: 9 % (ref 4–12)
NEUTROPHILS # BLD AUTO: 4.23 THOUSANDS/ΜL (ref 1.85–7.62)
NEUTS SEG NFR BLD AUTO: 57 % (ref 43–75)
NITRITE UR QL STRIP: NEGATIVE
NRBC BLD AUTO-RTO: 0 /100 WBCS
PH UR STRIP.AUTO: 6 [PH] (ref 4.5–8)
PLATELET # BLD AUTO: 222 THOUSANDS/UL (ref 149–390)
PMV BLD AUTO: 9.3 FL (ref 8.9–12.7)
POTASSIUM SERPL-SCNC: 3.6 MMOL/L (ref 3.5–5.3)
PROT SERPL-MCNC: 6.9 G/DL (ref 6.4–8.2)
PROT UR STRIP-MCNC: NEGATIVE MG/DL
PROTHROMBIN TIME: 13.5 SECONDS (ref 11.6–14.5)
RBC # BLD AUTO: 4.82 MILLION/UL (ref 3.88–5.62)
SODIUM SERPL-SCNC: 144 MMOL/L (ref 136–145)
SP GR UR STRIP.AUTO: 1.02 (ref 1–1.03)
TROPONIN I SERPL-MCNC: <0.02 NG/ML
UROBILINOGEN UR QL STRIP.AUTO: 0.2 E.U./DL
WBC # BLD AUTO: 7.43 THOUSAND/UL (ref 4.31–10.16)

## 2021-10-20 PROCEDURE — 80053 COMPREHEN METABOLIC PANEL: CPT | Performed by: EMERGENCY MEDICINE

## 2021-10-20 PROCEDURE — 36415 COLL VENOUS BLD VENIPUNCTURE: CPT

## 2021-10-20 PROCEDURE — 96374 THER/PROPH/DIAG INJ IV PUSH: CPT

## 2021-10-20 PROCEDURE — 85025 COMPLETE CBC W/AUTO DIFF WBC: CPT | Performed by: EMERGENCY MEDICINE

## 2021-10-20 PROCEDURE — 99284 EMERGENCY DEPT VISIT MOD MDM: CPT

## 2021-10-20 PROCEDURE — 85730 THROMBOPLASTIN TIME PARTIAL: CPT | Performed by: PHYSICIAN ASSISTANT

## 2021-10-20 PROCEDURE — 96361 HYDRATE IV INFUSION ADD-ON: CPT

## 2021-10-20 PROCEDURE — 74177 CT ABD & PELVIS W/CONTRAST: CPT

## 2021-10-20 PROCEDURE — 99285 EMERGENCY DEPT VISIT HI MDM: CPT | Performed by: PHYSICIAN ASSISTANT

## 2021-10-20 PROCEDURE — 96375 TX/PRO/DX INJ NEW DRUG ADDON: CPT

## 2021-10-20 PROCEDURE — 85610 PROTHROMBIN TIME: CPT | Performed by: PHYSICIAN ASSISTANT

## 2021-10-20 PROCEDURE — G1004 CDSM NDSC: HCPCS

## 2021-10-20 PROCEDURE — 71045 X-RAY EXAM CHEST 1 VIEW: CPT

## 2021-10-20 PROCEDURE — 83690 ASSAY OF LIPASE: CPT | Performed by: EMERGENCY MEDICINE

## 2021-10-20 PROCEDURE — 93005 ELECTROCARDIOGRAM TRACING: CPT

## 2021-10-20 PROCEDURE — 84484 ASSAY OF TROPONIN QUANT: CPT | Performed by: PHYSICIAN ASSISTANT

## 2021-10-20 PROCEDURE — 81003 URINALYSIS AUTO W/O SCOPE: CPT

## 2021-10-20 RX ORDER — KETOROLAC TROMETHAMINE 30 MG/ML
15 INJECTION, SOLUTION INTRAMUSCULAR; INTRAVENOUS ONCE
Status: COMPLETED | OUTPATIENT
Start: 2021-10-20 | End: 2021-10-20

## 2021-10-20 RX ORDER — ONDANSETRON 4 MG/1
4 TABLET, ORALLY DISINTEGRATING ORAL ONCE
Status: COMPLETED | OUTPATIENT
Start: 2021-10-20 | End: 2021-10-20

## 2021-10-20 RX ORDER — DICYCLOMINE HCL 20 MG
20 TABLET ORAL ONCE
Status: COMPLETED | OUTPATIENT
Start: 2021-10-20 | End: 2021-10-20

## 2021-10-20 RX ORDER — MAGNESIUM HYDROXIDE/ALUMINUM HYDROXICE/SIMETHICONE 120; 1200; 1200 MG/30ML; MG/30ML; MG/30ML
30 SUSPENSION ORAL ONCE
Status: COMPLETED | OUTPATIENT
Start: 2021-10-20 | End: 2021-10-20

## 2021-10-20 RX ADMIN — IOHEXOL 50 ML: 240 INJECTION, SOLUTION INTRATHECAL; INTRAVASCULAR; INTRAVENOUS; ORAL at 21:22

## 2021-10-20 RX ADMIN — SODIUM CHLORIDE 1000 ML: 0.9 INJECTION, SOLUTION INTRAVENOUS at 21:07

## 2021-10-20 RX ADMIN — FAMOTIDINE 20 MG: 10 INJECTION, SOLUTION INTRAVENOUS at 21:06

## 2021-10-20 RX ADMIN — ONDANSETRON 4 MG: 4 TABLET, ORALLY DISINTEGRATING ORAL at 21:06

## 2021-10-20 RX ADMIN — ALUMINA, MAGNESIA, AND SIMETHICONE ORAL SUSPENSION REGULAR STRENGTH 30 ML: 1200; 1200; 120 SUSPENSION ORAL at 21:06

## 2021-10-20 RX ADMIN — KETOROLAC TROMETHAMINE 15 MG: 30 INJECTION, SOLUTION INTRAMUSCULAR at 23:19

## 2021-10-20 RX ADMIN — IOHEXOL 100 ML: 350 INJECTION, SOLUTION INTRAVENOUS at 23:10

## 2021-10-20 RX ADMIN — DICYCLOMINE HYDROCHLORIDE 20 MG: 20 TABLET ORAL at 23:19

## 2021-10-21 VITALS
DIASTOLIC BLOOD PRESSURE: 76 MMHG | RESPIRATION RATE: 15 BRPM | TEMPERATURE: 98.2 F | OXYGEN SATURATION: 93 % | HEART RATE: 50 BPM | SYSTOLIC BLOOD PRESSURE: 109 MMHG

## 2021-10-21 RX ORDER — POLYETHYLENE GLYCOL 3350 17 G/17G
17 POWDER, FOR SOLUTION ORAL DAILY
Qty: 51 G | Refills: 0 | Status: SHIPPED | OUTPATIENT
Start: 2021-10-21 | End: 2021-10-24

## 2021-10-21 RX ORDER — ONDANSETRON 4 MG/1
4 TABLET, FILM COATED ORAL EVERY 6 HOURS
Qty: 12 TABLET | Refills: 0 | Status: SHIPPED | OUTPATIENT
Start: 2021-10-21 | End: 2021-10-24

## 2021-10-21 RX ORDER — FAMOTIDINE 20 MG/1
20 TABLET, FILM COATED ORAL 2 TIMES DAILY
Qty: 30 TABLET | Refills: 0 | Status: SHIPPED | OUTPATIENT
Start: 2021-10-21

## 2021-10-22 LAB
ATRIAL RATE: 60 BPM
P AXIS: 79 DEGREES
PR INTERVAL: 164 MS
QRS AXIS: -33 DEGREES
QRSD INTERVAL: 82 MS
QT INTERVAL: 422 MS
QTC INTERVAL: 422 MS
T WAVE AXIS: 28 DEGREES
VENTRICULAR RATE: 60 BPM

## 2021-10-22 PROCEDURE — 93010 ELECTROCARDIOGRAM REPORT: CPT | Performed by: INTERNAL MEDICINE

## 2021-11-03 NOTE — ASSESSMENT & PLAN NOTE
Upon review of data patient does have slightly elevated AHI using the new machine  Unclear if this is related to machine variation and increased sensitivity of the newer machines  Patient may possibly require an increase in the base pressure  Central apnea index is normal based on compliance download  Periodic mask, tubing, filter replacement every 1-3 months is advised and periodic  approximately every year is advised  Uses CPAP during all sleep periods is discussed  Avoidance of sedatives, supine sleep, alcohol, narcotics in the setting of untreated sleep apnea is discussed  Absolute avoidance of driving while drowsy is also discussed  Avoid weight gain/encourage weight loss 
IMPROVE-DD Application Not Available

## 2021-11-24 ENCOUNTER — HOSPITAL ENCOUNTER (OUTPATIENT)
Dept: ULTRASOUND IMAGING | Facility: HOSPITAL | Age: 51
Discharge: HOME | End: 2021-11-24
Payer: COMMERCIAL

## 2021-11-24 DIAGNOSIS — R10.13 ABDOMINAL PAIN, EPIGASTRIC: ICD-10-CM

## 2021-11-24 PROCEDURE — 76700 US EXAM ABDOM COMPLETE: CPT

## 2021-11-29 ENCOUNTER — NURSE TRIAGE (OUTPATIENT)
Dept: OTHER | Facility: OTHER | Age: 51
End: 2021-11-29

## 2021-12-27 ENCOUNTER — OFFICE VISIT (OUTPATIENT)
Dept: GASTROENTEROLOGY | Facility: CLINIC | Age: 51
End: 2021-12-27
Payer: COMMERCIAL

## 2021-12-27 VITALS
HEART RATE: 99 BPM | BODY MASS INDEX: 26.37 KG/M2 | WEIGHT: 199 LBS | TEMPERATURE: 98.1 F | SYSTOLIC BLOOD PRESSURE: 125 MMHG | DIASTOLIC BLOOD PRESSURE: 86 MMHG | HEIGHT: 73 IN

## 2021-12-27 DIAGNOSIS — R10.13 EPIGASTRIC PAIN: Primary | ICD-10-CM

## 2021-12-27 PROCEDURE — 99204 OFFICE O/P NEW MOD 45 MIN: CPT | Performed by: PHYSICIAN ASSISTANT

## 2022-02-11 ENCOUNTER — TELEPHONE (OUTPATIENT)
Dept: SLEEP CENTER | Facility: CLINIC | Age: 52
End: 2022-02-11

## 2022-02-11 NOTE — TELEPHONE ENCOUNTER
Patient called stating he is having problems with his insurance company authorizing his CPAP machine and would like Dr Kitty Bruce help with this  Advised patient he has never been seen in the sleep center  Patient states he is seen in Dr Kitty Bruce private office  Advised to call that office with any CPAP issues  Verbalized understanding

## 2022-02-28 ENCOUNTER — TELEPHONE (OUTPATIENT)
Dept: INFECTIOUS DISEASES | Facility: CLINIC | Age: 52
End: 2022-02-28

## 2022-02-28 NOTE — TELEPHONE ENCOUNTER
Patient calls our office as he is going on trip and is utd on vaccines, but needs malarone  Leaving in mid-may for 45 days  No records in file  Requesting records - he states from 2017/2018  We will call back when we obtain them  He states he saved the bottle from the [facility] and Dr Brad Mae names on it      CB: [phone]

## 2022-03-01 DIAGNOSIS — Z71.84 TRAVEL ADVICE ENCOUNTER: Primary | ICD-10-CM

## 2022-03-01 RX ORDER — ATOVAQUONE AND PROGUANIL HYDROCHLORIDE 250; 100 MG/1; MG/1
1 TABLET, FILM COATED ORAL
Qty: 53 TABLET | Refills: 1 | Status: SHIPPED | OUTPATIENT
Start: 2022-03-01 | End: 2022-04-23

## 2022-06-10 ENCOUNTER — OFFICE VISIT (OUTPATIENT)
Dept: PHYSICAL THERAPY | Facility: CLINIC | Age: 52
End: 2022-06-10
Payer: COMMERCIAL

## 2022-06-10 DIAGNOSIS — M77.12 LEFT LATERAL EPICONDYLITIS: Primary | ICD-10-CM

## 2022-06-10 PROCEDURE — 97162 PT EVAL MOD COMPLEX 30 MIN: CPT | Performed by: PHYSICAL THERAPIST

## 2022-06-10 PROCEDURE — 97112 NEUROMUSCULAR REEDUCATION: CPT | Performed by: PHYSICAL THERAPIST

## 2022-06-10 NOTE — PROGRESS NOTES
PT Evaluation Direct Access    Today's date: 6/10/2022  Patient name: Patricia Toribio  : 1970  MRN: [id number]  Referring provider: Eduard Shepard PT  Dx:   Encounter Diagnosis     ICD-10-CM    1  Left lateral epicondylitis  M77 12        Start Time: 830  Stop Time: 915  Total time in clinic (min): 45 minutes    Assessment  Assessment details: Patricia Toribio is a 47 y o  male was evaluated on 6/10/2022 under Direct Access for signs and symptoms consistent with left lateral epicontylitis  Patricia Toribio has the above listed impairments and will benefit from skilled PT to improve deficits to return to prior level of function  Under direct access, the patient can be treated for 30 days without a prescription from the physician  Please do not hesitate to contact me at [phone]  Thank you for allowing me to participate in Patricia Toribio care  Primary movement impairment diagnosis of weak wrist extensors resulting in pathoanatomical symptoms of No diagnosis found  and limiting his ability to , lift, and exercise  Impairments include:  1) weak wrist extensors  2) tightness in wrist extensors  3) pain and weakness with     Discussed risks, benefits, and alternatives to treatment, and answered all patient questions to patient satisfaction    Impairments: abnormal muscle firing, abnormal muscle tone, abnormal or restricted ROM, abnormal movement, impaired physical strength, lacks appropriate home exercise program, pain with function and poor posture   Understanding of Dx/Px/POC: good   Prognosis: good    Goals  Impairment Goals 4-6 weeks  - Decrease pain to <3/10  - Increase wrist strength to 5/5 throughout  - Increase scapular strength to 5/5 throughout  - Improve  strength by 10 lbs on L    Functional Goals 6-8 weeks  - Return to Prior Level of Function  - Patient will be independent with HEP  - Patient will be able to lift overhead without increased pain/compensation/difficulty   - Patient will be able to kayak without increased pain/compensation/difficulty   - Patient will be able to  objects      Plan  Patient would benefit from: skilled physical therapy  Planned modality interventions: cryotherapy, electrical stimulation/Russian stimulation, high voltage pulsed current: pain management, high voltage pulsed current: spasm management, H-Wave, TENS, thermotherapy: hydrocollator packs and unattended electrical stimulation  Planned therapy interventions: abdominal trunk stabilization, behavior modification, body mechanics training, breathing training, flexibility, functional ROM exercises, home exercise program, joint mobilization, manual therapy, massage, Luciano taping, muscle pump exercises, neuromuscular re-education, patient education, postural training, strengthening, stretching, therapeutic activities, therapeutic exercise and therapeutic training  Frequency: 1x week  Duration in weeks: 4  Treatment plan discussed with: patient    Subjective Evaluation    History of Present Illness  Mechanism of injury: 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[employer]  Not working currently because it is summer  HOBBIES/EXERCISE: not exercising much, walking  PLOF:  Has had R elbow pain in the past  Has L hip pain that limits him  HISTORY OF CURRENT INJURY:  Patient started having L elbow pain a few months ago, with insidious onset  R handed  He started noticing aching in the elbow  PAIN LOCATION/DESCRIPTORS: L lateral elbow, aching  No sharp pain  No numbness/tingling or burning  AGGRAVATING FACTORS:  Lifting overhead, twisting, pinching  EASES: rest  DAY PATTERN: none  IMAGING:  none  SPECIAL QUESTIONS:    Trace Guerrero denies a new onset of Recent unexplained weight loss, Clumsy or unsteadiness, Constant night pain and History of cancer  PATIENT GOALS: Patient wishes to be able to go to kayaking trip without discomfort in elbow      Pain  Current pain ratin  At best pain ratin  At worst pain rating: 3  Quality: dull ache  Progression: no change    Patient Goals  Patient goals for therapy: decreased pain, return to sport/leisure activities and independence with ADLs/IADLs          Objective     Active Range of Motion     Left Elbow   Normal active range of motion  Flexion: 150 degrees with pain  Extension: 0 degrees   Forearm supination: WFL  Forearm pronation: WFL    Right Elbow   Normal active range of motion  Flexion: 150 degrees   Extension: 0 degrees   Forearm supination: WFL  Forearm pronation: [facility]    Additional Active Range of Motion Details  Pain at end range of flexion on L    Strength/Myotome Testing     Left Elbow   Flexion: 4+  Extension: 4+  Forearm supination: 4+  Forearm pronation: 4+    Right Elbow   Normal strength    Additional Strength Details  Shoulder strength WNL  Cervical screen WNL    Wrist strength 4/5 on L with asterisks sign pain with resisted extension    Tests     Left Elbow   Positive Cozen's, Maudsley's and Mill's  Right Elbow   Negative Cozen's, Maudsley's and Mill's       Additional Tests Details  Asterisks pain with lateral epicondylitis tests  TTP of lateral elbow and extensor mm     strength R 95 lbs, L 75 lbs             Diagnosis: L lateral epicondylitis   Precautions: none   Primary Goals: 1) weak wrist extensors  2) tightness in wrist extensors  3) pain and weakness with    *asterisks by exercise = given for HEP   Manuals 6/10       EPAT        IASTM                                There Ex        UBE        Wrist flx S on table* 3x30 sec       Wrist ext S                                                        Neuro Re-Ed        Sundra Cover twist* Green flexbar x 10       DB wrist eccentrics* 3# 2x10       Gripper        flexbar pron/sup        flexbar flx/ext        DB pron/sup        Prone ITY        SL ER                                         Re-evaluation              Ther Act                                         Modalities

## 2022-06-13 ENCOUNTER — OFFICE VISIT (OUTPATIENT)
Dept: PHYSICAL THERAPY | Facility: CLINIC | Age: 52
End: 2022-06-13
Payer: COMMERCIAL

## 2022-06-13 DIAGNOSIS — M77.12 LEFT LATERAL EPICONDYLITIS: Primary | ICD-10-CM

## 2022-06-13 PROCEDURE — 97140 MANUAL THERAPY 1/> REGIONS: CPT | Performed by: PHYSICAL THERAPIST

## 2022-06-13 PROCEDURE — 97112 NEUROMUSCULAR REEDUCATION: CPT | Performed by: PHYSICAL THERAPIST

## 2022-06-13 PROCEDURE — 97110 THERAPEUTIC EXERCISES: CPT | Performed by: PHYSICAL THERAPIST

## 2022-06-13 NOTE — PROGRESS NOTES
Daily Note     Today's date: 2022  Patient name: Pedro Duke  : 1970  MRN: [id number]  Referring provider: Lynnette Benson PT  Dx:   Encounter Diagnosis     ICD-10-CM    1  Left lateral epicondylitis  M77 12        Start Time: 1130  Stop Time: 1215  Total time in clinic (min): 45 minutes    Subjective: Patient reports his elbow feels the same  He also notes he does not feel the stretches anywhere, but his tricep hurts  Objective: See treatment diary below    Assessment: session today focused on EPAT to involved tendons and muscles  D20 utilized for all this date at different levels  Most soreness noted at lateral epicondyle and wrist extensor tendons, and at tricep tendon and distal tricep  Added flexbar pron/sup to HEP  Plan to progress into triceps eccentrics NV if this continues to be a sore area NV  Plan: Continue per plan of care  Diagnosis: L lateral epicondylitis   Precautions: none   Primary Goals: 1) weak wrist extensors  2) tightness in wrist extensors  3) pain and weakness with    *asterisks by exercise = given for HEP   Manuals 6/10 6/13      EPAT  D20 forarm 1 0 14 Hz, wrist extensor origin   9 12 Hz, distal tricep   7 12 Hz      IASTM                                There Ex        UBE        Wrist flx S on table* 3x30 sec 3x30 sec      Wrist ext S  X 30 sec      Tricep S  No S felt                                               Neuro Re-Ed        [facility] twist* Green flexbar x 10 Green flexbar 3x10      DB wrist eccentrics* 3# 2x10 3# 3x10      Gripper        flexbar pron/sup*  X 20 Green      flexbar flx/ext  X 20 Green      DB pron/sup        Prone ITY        SL ER        Tricep eccentrics  NV                               Re-evaluation              Ther Act                                         Modalities

## 2022-06-23 ENCOUNTER — OFFICE VISIT (OUTPATIENT)
Dept: PHYSICAL THERAPY | Facility: CLINIC | Age: 52
End: 2022-06-23
Payer: COMMERCIAL

## 2022-06-23 DIAGNOSIS — M77.12 LEFT LATERAL EPICONDYLITIS: Primary | ICD-10-CM

## 2022-06-23 PROCEDURE — 97110 THERAPEUTIC EXERCISES: CPT | Performed by: PHYSICAL THERAPIST

## 2022-06-23 PROCEDURE — 97112 NEUROMUSCULAR REEDUCATION: CPT | Performed by: PHYSICAL THERAPIST

## 2022-06-23 PROCEDURE — 97140 MANUAL THERAPY 1/> REGIONS: CPT | Performed by: PHYSICAL THERAPIST

## 2022-06-23 NOTE — PROGRESS NOTES
Daily Note     Today's date: 2022  Patient name: Courtney Sim  : 1970  MRN: [id number]  Referring provider: Osmin Austin PT  Dx:   Encounter Diagnosis     ICD-10-CM    1  Left lateral epicondylitis  M77 12        Start Time: 1630  Stop Time: 8369  Total time in clinic (min): 45 minutes    Subjective: Patient reports that his elbow is about the same  Objective: See treatment diary below    Assessment: Patient tolerated session well, especially E-PAT this date to L elbow and extensor origin  He is progressing well with manuals and exercises at home  Added shoulder and scap stabilization to program this date  Plan: Continue per plan of care  Diagnosis: L lateral epicondylitis   Precautions: none   Primary Goals: 1) weak wrist extensors  2) tightness in wrist extensors  3) pain and weakness with    *asterisks by exercise = given for HEP   Manuals 6/10 6/13 6/23     EPAT  D20 forarm 1 0 14 Hz, wrist extensor origin   9 12 Hz, distal tricep   7 12 Hz D20 forarm 1 0 14 Hz, wrist extensor origin,   F15 nook of elbow 1 0 14 Hz     IASTM                                There Ex        UBE   2/2     Wrist flx S on table* 3x30 sec 3x30 sec      Wrist ext S  X 30 sec      Tricep S  No S felt                                               Neuro Re-Ed        Mery Nipper twist* Green flexbar x 10 Green flexbar 3x10 Green flexbar 3x10     DB wrist eccentrics* 3# 2x10 3# 3x10 3# 3x10     Gripper        flexbar pron/sup*  X 20 Green X 30 green     flexbar flx/ext  X 20 Green X 30 green     DB pron/sup   3# 2 x 10 ea     Prone ITY   X 10 ea     SL ER   X 10 ea     Tricep eccentrics                                 Re-evaluation              Ther Act                                         Modalities

## 2022-06-27 ENCOUNTER — OFFICE VISIT (OUTPATIENT)
Dept: PHYSICAL THERAPY | Facility: CLINIC | Age: 52
End: 2022-06-27
Payer: COMMERCIAL

## 2022-06-27 DIAGNOSIS — M77.12 LEFT LATERAL EPICONDYLITIS: Primary | ICD-10-CM

## 2022-06-27 PROCEDURE — 97140 MANUAL THERAPY 1/> REGIONS: CPT | Performed by: PHYSICAL THERAPIST

## 2022-06-27 PROCEDURE — 97110 THERAPEUTIC EXERCISES: CPT | Performed by: PHYSICAL THERAPIST

## 2022-06-27 PROCEDURE — 97112 NEUROMUSCULAR REEDUCATION: CPT | Performed by: PHYSICAL THERAPIST

## 2022-06-27 NOTE — PROGRESS NOTES
Daily Note     Today's date: 2022  Patient name: Jenna Santos  : 1970  MRN: [id number]  Referring provider: Rabia Rodriguez, PT  Dx:   Encounter Diagnosis     ICD-10-CM    1  Left lateral epicondylitis  M77 12        Start Time:   Stop Time: 153  Total time in clinic (min): 45 minutes    Subjective: Patient reports he feels about the same as last week  He feels good after the E-PAT machine  Objective: See treatment diary below    Assessment: Tolerated treatment well with EPAT to L lateral epicondyle and extensor mm  Added RTC and scapular strengthening to stabilize shoulder and take pressure off of the wrist extensor muscles  Patient had some discomfort picking up the 3# weight in prone  FOTO reassessed today with improvement in score demonstrating progress towards functional goals  Plan: Progress note during next visit  Potential discharge next visit  Diagnosis: L lateral epicondylitis   Precautions: none   Primary Goals: 1) weak wrist extensors  2) tightness in wrist extensors  3) pain and weakness with    *asterisks by exercise = given for HEP   Manuals 6/10 6/13 6/23 6/27    EPAT  D20 forarm 1 0 14 Hz, wrist extensor origin   9 12 Hz, distal tricep   7 12 Hz D20 forarm 1 0 14 Hz, wrist extensor origin,   F15 nook of elbow 1 0 14 Hz D20 forarm 1 0 14 Hz, wrist extensor origin,   F15 nook of elbow 1 0 14 Hz    IASTM                                There Ex        UBE   2/2 2/2    Wrist flx S on table* 3x30 sec 3x30 sec  3x30 sec    Wrist ext S  X 30 sec      Tricep S  No S felt                                               Neuro Re-Ed        Ayad Dessert twist* Green flexbar x 10 Green flexbar 3x10 Green flexbar 3x10     DB wrist eccentrics* 3# 2x10 3# 3x10 3# 3x10     Gripper        flexbar pron/sup*  X 20 Green X 30 green     flexbar flx/ext  X 20 Green X 30 green     DB pron/sup   3# 2 x 10 ea     Prone ITY   X 10 ea scap retraction x 20, ITY x 10 ea 2#    SL ER   X 10 ea 3x10 3#-2# Tricep eccentrics                                 Re-evaluation              Ther Act                                         Modalities

## 2022-06-30 ENCOUNTER — EVALUATION (OUTPATIENT)
Dept: PHYSICAL THERAPY | Facility: CLINIC | Age: 52
End: 2022-06-30
Payer: COMMERCIAL

## 2022-06-30 DIAGNOSIS — M77.12 LEFT LATERAL EPICONDYLITIS: Primary | ICD-10-CM

## 2022-06-30 PROCEDURE — 97110 THERAPEUTIC EXERCISES: CPT | Performed by: PHYSICAL THERAPIST

## 2022-06-30 PROCEDURE — 97112 NEUROMUSCULAR REEDUCATION: CPT | Performed by: PHYSICAL THERAPIST

## 2022-06-30 NOTE — PROGRESS NOTES
PT Re-Evaluation     Today's date: 2022  Patient name: Coleman Winter  : 1970  MRN: [id number]  Referring provider: Alivia Mesa PT  Dx:   Encounter Diagnosis     ICD-10-CM    1  Left lateral epicondylitis  M77 12        Start Time: 8452  Stop Time:   Total time in clinic (min): 43 minutes    Assessment  Assessment details: Coleman Winter is a 47 y o  male was evaluated on 6/10/2022 under Direct Access for signs and symptoms consistent with left lateral epicontylitis  He has made progress in  strength and irritability, but still has pain with gripping and lifting that requires continued PT per POC  Patient will be traveling for the next 2 weeks and then wishes to continue PT treatment  Patient educated that prognosis is 9+ months of recovery  Primary movement impairment diagnosis of weak wrist extensors resulting in pathoanatomical symptoms of left lateral epicondylitis and limiting his ability to , lift, and exercise  Impairments include:  1) weak wrist extensors  2) tightness in wrist extensors  3) pain and weakness with     Discussed risks, benefits, and alternatives to treatment, and answered all patient questions to patient satisfaction    Impairments: abnormal muscle firing, abnormal muscle tone, abnormal or restricted ROM, abnormal movement, impaired physical strength, lacks appropriate home exercise program, pain with function and poor posture   Understanding of Dx/Px/POC: good   Prognosis: good    Goals  Impairment Goals 4-6 weeks  - Decrease pain to <3/10 - met  - Increase wrist strength to 5/5 throughout - partially met  - Increase scapular strength to 5/5 throughout - partially met  - Improve  strength by 10 lbs on L - partially met    Functional Goals 6-8 weeks  - Return to Prior Level of Function - partially met  - Patient will be independent with HEP - met  - Patient will be able to lift overhead without increased pain/compensation/difficulty - not met  - Patient will be able to kayak without increased pain/compensation/difficulty - not met  - Patient will be able to  objects - partially met      Plan  Patient would benefit from: skilled physical therapy  Planned modality interventions: cryotherapy, electrical stimulation/Russian stimulation, high voltage pulsed current: pain management, high voltage pulsed current: spasm management, H-Wave, TENS, thermotherapy: hydrocollator packs and unattended electrical stimulation  Planned therapy interventions: abdominal trunk stabilization, behavior modification, body mechanics training, breathing training, flexibility, functional ROM exercises, home exercise program, joint mobilization, manual therapy, massage, Luciano taping, muscle pump exercises, neuromuscular re-education, patient education, postural training, strengthening, stretching, therapeutic activities, therapeutic exercise and therapeutic training  Frequency: 1x week  Duration in weeks: 4  Treatment plan discussed with: patient    Subjective Evaluation    History of Present Illness  Mechanism of injury: 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  HOBBIES/EXERCISE: not exercising much, walking  PLOF:  Has had R elbow pain in the past  Has L hip pain that limits him  HISTORY OF CURRENT INJURY:  Patient started having L elbow pain a few months ago, with insidious onset  R handed  He started noticing aching in the elbow  Update: Patient reports he feels the same as at IE  PAIN LOCATION/DESCRIPTORS: No pain at rest  L lateral elbow, aching  No sharp pain  No numbness/tingling or burning  AGGRAVATING FACTORS:  Lifting overhead, twisting, stabilizing objects  Improved: pinching  EASES: rest  DAY PATTERN: none  IMAGING:  none  SPECIAL QUESTIONS:    Noel Peralta denies a new onset of Recent unexplained weight loss, Clumsy or unsteadiness, Constant night pain and History of cancer    PATIENT GOALS: Patient wishes to be able [place] discomfort in elbow  - 50% improved    Pain  Current pain ratin  At best pain ratin  At worst pain ratin  Quality: dull ache  Progression: no change    Patient Goals  Patient goals for therapy: decreased pain, return to sport/leisure activities and independence with ADLs/IADLs          Objective     Active Range of Motion     Left Elbow   Normal active range of motion  Flexion: 150 degrees with pain  Extension: 0 degrees   Forearm supination: WFL  Forearm pronation: WFL    Right Elbow   Normal active range of motion  Flexion: 150 degrees   Extension: 0 degrees   Forearm supination: [facility]  Forearm pronation: [facility]    Additional Active Range of Motion Details  No pain at end range of flexion on L    Strength/Myotome Testing     Left Elbow   Flexion: 4+  Extension: 4+  Forearm supination: 4+  Forearm pronation: 4+    Right Elbow   Normal strength    Additional Strength Details  Shoulder strength WNL  Cervical screen WNL    Wrist strength 4+/5 flx, 4/5 ext L with asterisks sign pain with resisted extension    Tests     Left Elbow   Positive Cozen's  Negative Maudsley's and Mill's    Right Elbow   Negative Cozen's, Maudsley's and Mill's  Additional Tests Details  TTP of lateral elbow and extensor mm     strength R 95 lbs, L 80 lbs              Diagnosis: L lateral epicondylitis   Precautions: none   Primary Goals: 1) weak wrist extensors  2) tightness in wrist extensors  3) pain and weakness with    *asterisks by exercise = given for HEP   Manuals 6/10 6/13 6/23 6/27 6/30   EPAT  D20 forarm 1 0 14 Hz, wrist extensor origin   9 12 Hz, distal tricep   7 12 Hz D20 forarm 1 0 14 Hz, wrist extensor origin,   F15 nook of elbow 1 0 14 Hz D20 forarm 1 0 14 Hz, wrist extensor origin,   F15 nook of elbow 1 0 14 Hz    IASTM     IM, PT   Elbow MWM     IM, PT                   There Ex        UBE   2/2 2/2    Wrist flx S on table* 3x30 sec 3x30 sec  3x30 sec Review of HEP   Wrist ext S  X 30 sec      Tricep S  No S felt                                               Neuro Re-Ed        Vester Mages twist* Green flexbar x 10 Green flexbar 3x10 Green flexbar 3x10     DB wrist eccentrics* 3# 2x10 3# 3x10 3# 3x10     Gripper        flexbar pron/sup*  X 20 Green X 30 green     flexbar flx/ext  X 20 Green X 30 green     DB pron/sup   3# 2 x 10 ea     Prone ITY*   X 10 ea scap retraction x 20, ITY x 10 ea 2# Bent over ITY x 20 ea 2#   SL ER*   X 10 ea 3x10 3#-2#    Tricep eccentrics                                 Re-evaluation          IM, PT    Ther Act                                         Modalities

## 2022-06-30 NOTE — LETTER
2022    Luiz Martinez [address] [place] [address]    Patient: Cherrie Kendall   YOB: 1970   Date of Visit: 2022     Encounter Diagnosis     ICD-10-CM    1  Left lateral epicondylitis  M77 12        Dear Dr Luis Fernando Donis:    Cherrie eKndall is a 47 y o  male who was evaluated on 2022 under Direct Access for signs and symptoms consistent with   1  Left lateral epicondylitis      Cherrie Kendall has the above listed impairments and will benefit from skilled PT to improve deficits to return to prior level of function  Under direct access, the patient can be treated for 30 days without a prescription from the physician  He has now been treated for this timeframe and requires a signed plan of care to continue treatment  Please do not hesitate to contact me at [phone]  Thank you for allowing me to participate in Cherrie Kendall care  Please verify that you agree with the plan of care by signing the attached order  I sincerely appreciate the opportunity to share in the care of one of your patients and hope to have another opportunity to work with you in the near future  Sincerely,    Solo Kline, PT      Referring Provider:      I certify that I have read the below Plan of Care and certify the need for these services furnished under this plan of treatment while under my care  Mikaela Brice MD  [address] [place] [address]  Via Fax: [phone]          PT Re-Evaluation     Today's date: 2022  Patient name: Cherrie Kendall  : 1970  MRN: [id number]  Referring provider: Wagner Cabrera PT  Dx:   Encounter Diagnosis     ICD-10-CM    1   Left lateral epicondylitis  M77 12        Start Time: 3726  Stop Time: 1528  Total time in clinic (min): 43 minutes    Assessment  Assessment details: Cherrie Kendall is a 47 y o  male was evaluated on 6/10/2022 under Direct Access for signs and symptoms consistent with left lateral epicontylitis  He has made progress in  strength and irritability, but still has pain with gripping and lifting that requires continued PT per POC  Patient will be traveling for the next 2 weeks and then wishes to continue PT treatment  Patient educated that prognosis is 9+ months of recovery  Primary movement impairment diagnosis of weak wrist extensors resulting in pathoanatomical symptoms of left lateral epicondylitis and limiting his ability to , lift, and exercise  Impairments include:  1) weak wrist extensors  2) tightness in wrist extensors  3) pain and weakness with     Discussed risks, benefits, and alternatives to treatment, and answered all patient questions to patient satisfaction    Impairments: abnormal muscle firing, abnormal muscle tone, abnormal or restricted ROM, abnormal movement, impaired physical strength, lacks appropriate home exercise program, pain with function and poor posture   Understanding of Dx/Px/POC: good   Prognosis: good    Goals  Impairment Goals 4-6 weeks  - Decrease pain to <3/10 - met  - Increase wrist strength to 5/5 throughout - partially met  - Increase scapular strength to 5/5 throughout - partially met  - Improve  strength by 10 lbs on L - partially met    Functional Goals 6-8 weeks  - Return to Prior Level of Function - partially met  - Patient will be independent with HEP - met  - Patient will be able to lift overhead without increased pain/compensation/difficulty - not met  - Patient will be able to kayak without increased pain/compensation/difficulty - not met  - Patient will be able to  objects - partially met      Plan  Patient would benefit from: skilled physical therapy  Planned modality interventions: cryotherapy, electrical stimulation/Russian stimulation, high voltage pulsed current: pain management, high voltage pulsed current: spasm management, H-Wave, TENS, thermotherapy: hydrocollator packs and unattended electrical stimulation  Planned therapy interventions: abdominal trunk stabilization, behavior modification, body mechanics training, breathing training, flexibility, functional ROM exercises, home exercise program, joint mobilization, manual therapy, massage, Luciano taping, muscle pump exercises, neuromuscular re-education, patient education, postural training, strengthening, stretching, therapeutic activities, therapeutic exercise and therapeutic training  Frequency: 1x week  Duration in weeks: 4  Treatment plan discussed with: patient    Subjective Evaluation    History of Present Illness  Mechanism of injury: 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  HOBBIES/EXERCISE: not exercising much, walking  PLOF:  Has had R elbow pain in the past  Has L hip pain that limits him  HISTORY OF CURRENT INJURY:  Patient started having L elbow pain a few months ago, with insidious onset  R handed  He started noticing aching in the elbow  Update: Patient reports he feels the same as at IE  PAIN LOCATION/DESCRIPTORS: No pain at rest  L lateral elbow, aching  No sharp pain  No numbness/tingling or burning  AGGRAVATING FACTORS:  Lifting overhead, twisting, stabilizing objects  Improved: pinching  EASES: rest  DAY PATTERN: none  IMAGING:  none  SPECIAL QUESTIONS:    Rafael Zulma denies a new onset of Recent unexplained weight loss, Clumsy or unsteadiness, Constant night pain and History of cancer  PATIENT GOALS: Patient wishes to be able to go to kayaking trip without discomfort in elbow   - 50% improved    Pain  Current pain ratin  At best pain ratin  At worst pain ratin  Quality: dull ache  Progression: no change    Patient Goals  Patient goals for therapy: decreased pain, return to sport/leisure activities and independence with ADLs/IADLs          Objective     Active Range of Motion     Left Elbow   Normal active range of motion  Flexion: 150 degrees with pain  Extension: 0 degrees   Forearm supination: WFL  Forearm pronation: WFL    Right Elbow   Normal active range of motion  Flexion: 150 degrees   Extension: 0 degrees   Forearm supination: WFL  Forearm pronation: [facility]    Additional Active Range of Motion Details  No pain at end range of flexion on L    Strength/Myotome Testing     Left Elbow   Flexion: 4+  Extension: 4+  Forearm supination: 4+  Forearm pronation: 4+    Right Elbow   Normal strength    Additional Strength Details  Shoulder strength WNL  Cervical screen WNL    Wrist strength 4+/5 flx, 4/5 ext L with asterisks sign pain with resisted extension    Tests     Left Elbow   Positive Cozen's  Negative Maudsley's and Mill's    Right Elbow   Negative Cozen's, Maudsley's and Mill's  Additional Tests Details  TTP of lateral elbow and extensor mm     strength R 95 lbs, L 80 lbs              Diagnosis: L lateral epicondylitis   Precautions: none   Primary Goals: 1) weak wrist extensors  2) tightness in wrist extensors  3) pain and weakness with    *asterisks by exercise = given for HEP   Manuals 6/10 6/13 6/23 6/27 6/30   EPAT  D20 forarm 1 0 14 Hz, wrist extensor origin   9 12 Hz, distal tricep   7 12 Hz D20 forarm 1 0 14 Hz, wrist extensor origin,   F15 nook of elbow 1 0 14 Hz D20 forarm 1 0 14 Hz, wrist extensor origin,   F15 nook of elbow 1 0 14 Hz    IASTM     IM, PT   Elbow MWM     IM, PT                   There Ex        UBE   2/2 2/2    Wrist flx S on table* 3x30 sec 3x30 sec  3x30 sec Review of HEP   Wrist ext S  X 30 sec      Tricep S  No S felt                                               Neuro Re-Ed        Michael twist* Green flexbar x 10 Green flexbar 3x10 Green flexbar 3x10     DB wrist eccentrics* 3# 2x10 3# 3x10 3# 3x10     Gripper        flexbar pron/sup*  X 20 Green X 30 green     flexbar flx/ext  X 20 Green X 30 green     DB pron/sup   3# 2 x 10 ea     Prone ITY*   X 10 ea scap retraction x 20, ITY x 10 ea 2# Bent over ITY x 20 ea 2#   SL ER*   X 10 ea 3x10 3#-2#    Tricep eccentrics                                 Re-evaluation          IM, PT    Ther Act                                         Modalities

## 2022-07-14 ENCOUNTER — OFFICE VISIT (OUTPATIENT)
Dept: PHYSICAL THERAPY | Facility: CLINIC | Age: 52
End: 2022-07-14
Payer: COMMERCIAL

## 2022-07-14 DIAGNOSIS — M25.552 CHRONIC LEFT HIP PAIN: Primary | ICD-10-CM

## 2022-07-14 DIAGNOSIS — M77.12 LEFT LATERAL EPICONDYLITIS: ICD-10-CM

## 2022-07-14 DIAGNOSIS — G89.29 CHRONIC LEFT HIP PAIN: Primary | ICD-10-CM

## 2022-07-14 PROCEDURE — 97162 PT EVAL MOD COMPLEX 30 MIN: CPT | Performed by: PHYSICAL THERAPIST

## 2022-07-14 NOTE — PROGRESS NOTES
PT Evaluation  Direct Access    Today's date: 2022  Patient name: Ashley Novak  : 1970  MRN: [id number]  Referring provider: Paradise Childers PT  Dx:   Encounter Diagnosis     ICD-10-CM    1  Chronic left hip pain  M25 552     G89 29    2  Left lateral epicondylitis  M77 12        Start Time: 1400  Stop Time: 1445  Total time in clinic (min): 45 minutes    Assessment  Assessment details: Ashley Novak is a pleasant 47 y o  male who presents to DA evaluation with chronic L hip pain with s/s of GTPS and possible labral tear  He has gotten an injection in L hip which has improved deep joint pain, but lateral and posterior hip pain remains  The patient's greatest concerns are worry over not knowing what's wrong, concern at no signs of improvement and fear of not being able to keep active  No further referral appears necessary at this time based upon examination results  Primary movement impairment diagnosis of weakness in hips resulting in pathoanatomical symptoms of Chronic left hip pain  (primary encounter diagnosis)  Left lateral epicondylitis and limiting his ability to exercise or recreation, perform household chores, squat to  objects from the floor, stand and walk  Impairments include:  1) weakness in hip ER  2) weakness in hip abd  3) limited ROM L hip    Etiologic factors include none recalled by the patient  Discussed risks, benefits, and alternatives to treatment, and answered all patient questions to patient satisfaction    Impairments: abnormal gait, abnormal muscle firing, abnormal muscle tone, abnormal or restricted ROM, abnormal movement, activity intolerance, impaired balance, impaired physical strength, lacks appropriate home exercise program, pain with function and poor posture   Understanding of Dx/Px/POC: good   Prognosis: good    Goals  Impairment Goals 4-6 weeks  - Decrease pain to <3/10  - Improve hip AROM to equal to the unaffected lower extremity  - Increase hip strength to 4+/5 throughout    Functional Goals 6-8 weeks  - Return to Prior Level of Function  - Patient will be independent with HEP  - Patient will be able to squat without increased pain/compensation/difficulty  - Patient will be able to perform sit to stand without increased pain/compensation/difficulty   - Patient will be able to walk long distances without increased pain/compensation/difficulty     Plan  Plan details: Prognosis above is given PT services 2x/week tapering to 1x/week over the next 2 months and home program adherence  Patient would benefit from: skilled physical therapy  Planned modality interventions: cryotherapy, TENS, thermotherapy: hydrocollator packs and unattended electrical stimulation  Planned therapy interventions: abdominal trunk stabilization, behavior modification, body mechanics training, breathing training, flexibility, functional ROM exercises, home exercise program, joint mobilization, manual therapy, massage, Luciano taping, muscle pump exercises, neuromuscular re-education, patient education, postural training, strengthening, stretching, therapeutic activities, therapeutic exercise, therapeutic training and balance  Frequency: 2x week  Duration in weeks: 8  Treatment plan discussed with: patient        Subjective Evaluation    History of Present Illness  Mechanism of injury: 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  HOBBIES/EXERCISE: not exercising much, walking  PLOF:  Has had R elbow pain in the past  Has L hip pain that limits him  HISTORY OF CURRENT INJURY:  Patient started having L hip pain last year in the fall of last year  He does have clicking in the L hip  He tried PT and had maybe 2 sessions and it was awful with significant pain  He saw an ortho who diagnosed arthritis, and another doctor said it was a torn labrum  He got an injection of cortisone in the L hip 3-4 weeks ago   This did not seem to be helping for a while, but lately he has notice an improvement in the past few weeks  He notices that as the hip is improving he is finding other sore and nagging pains in the area  Lately he was able to walk 10+ miles without debilitating pain which is an improvement  PAIN LOCATION/DESCRIPTORS: Pain located in the L hip joint, described as deep  He gets occasional pain in the L glute and SIJ with some "crunchy things" in the glute  Pain does not go down the leg  No numbness or tingling  Occasional back pain when he wakes  AGGRAVATING FACTORS:  Rotating his foot outwards (ER), walking longer distances, getting out of a car, prolonged standing  EASES: shot, rest  DAY PATTERN: none  IMAGING: X ray of L hip, inconclusive   SPECIAL QUESTIONS:    Grayce Grounds denies a new onset of Tingling and Numbness  PATIENT GOALS: Patient wishes to be comfortable and active  Pain  Current pain ratin  At best pain ratin  At worst pain rating: 3  Quality: discomfort, dull ache and tight  Progression: improved    Patient Goals  Patient goals for therapy: decreased pain, increased motion, increased strength, independence with ADLs/IADLs, return to sport/leisure activities and improved balance          Objective     Active Range of Motion   Left Hip   [facility]ion[facility]  External rotation (90/90): 25 degrees with pain  Internal rotation (90/90): 30 degrees     Right Hip   Flexion: [facility]  External rotation (90/90): 35 degrees   Internal rotation (90/90): 20 degrees     Additional Active Range of Motion Details  PROM = AROM, pain on L with ER    Strength/Myotome Testing     Left Hip   Planes of Motion   Flexion: 4-  External rotation: 4-  Internal rotation: 4- (pain)    Right Hip   Planes of Motion   Flexion: 4+  External rotation: 4+  Internal rotation: 4+    Additional Strength Details  L hip asterisks pain with AROM ER, and resisted IR    Tests     Left Hip   Positive CHAZ, J sign and scour  90/90 SLR: Positive       Right Hip   Negative CHAZ J sign and scour  90/90 SLR: Positive       Additional Tests Details  Significant tightness in quad and hip flexor on L   Asterisks pain with resisted ER in SL and abduction    General Comments:      Hip Comments   Standing posture: shifting weight constantly, feeling of tightness in hip  Ambulation: narrow stride, slight trendelenberg  Squat: knee dominant, able to squat all the way down with external rotation of hips  SL squat: discomfort and tightness on L, knees over toes  SLS: 20 sec david, ankle strategy on R, hip and arms required on L with some pain             Diagnosis: L hip pain   Precautions: high symptom irritability   Primary Goals: 1) weakness in hip ER  2) weakness in hip abd  3) limited ROM L hip   *asterisks by exercise = given for HEP   Manuals 7/14       LAD L hip        Lateral hip mobs                                There Ex        Bike        Piriformis S*        Figure 4 S*        Gentle hip IR S        HS S        Prone quad S        Hip flexor S                        Neuro Re-Ed        Supine clams*        Bridge        SLR        SL SLR        SLS        Wobble board        Cup taps        TRX squats        X walks        Monster walks                         Re-evaluation              Ther Act                                         Modalities

## 2022-07-20 ENCOUNTER — APPOINTMENT (OUTPATIENT)
Dept: PHYSICAL THERAPY | Facility: CLINIC | Age: 52
End: 2022-07-20
Payer: COMMERCIAL

## 2022-07-25 ENCOUNTER — OFFICE VISIT (OUTPATIENT)
Dept: PHYSICAL THERAPY | Facility: CLINIC | Age: 52
End: 2022-07-25
Payer: COMMERCIAL

## 2022-07-25 DIAGNOSIS — G89.29 CHRONIC LEFT HIP PAIN: Primary | ICD-10-CM

## 2022-07-25 DIAGNOSIS — M25.552 CHRONIC LEFT HIP PAIN: Primary | ICD-10-CM

## 2022-07-25 DIAGNOSIS — M77.12 LEFT LATERAL EPICONDYLITIS: ICD-10-CM

## 2022-07-25 PROCEDURE — 97112 NEUROMUSCULAR REEDUCATION: CPT | Performed by: PHYSICAL THERAPIST

## 2022-07-25 PROCEDURE — 97110 THERAPEUTIC EXERCISES: CPT | Performed by: PHYSICAL THERAPIST

## 2022-07-25 NOTE — PROGRESS NOTES
Daily Note     Today's date: 2022  Patient name: Vasile Zamora  : 1970  MRN: [id number]  Referring provider: Linda Reilly, PT  Dx:   Encounter Diagnosis     ICD-10-CM    1  Chronic left hip pain  M25 552     G89 29    2  Left lateral epicondylitis  M77 12                   Subjective: "The clamshells hurt with the band so I did them on my side without anything  I was away so I really didn't get to do my exercises"       Objective: See treatment diary below      Assessment: Tolerated treatment fair  Patient would benefit from continued PT  Patient noted pain with almost all movement and stretches today  Able to complete PREs, but noted increasing pain with more repetitions on clamshells and SL hip abduction  Able to tolerate brides with minimal pain  Hamstring stretch was too painful to complete so this was held  Continue to progress hip strength and stability as able at future visits  Plan: Progress treatment as tolerated         Diagnosis: L hip pain   Precautions: high symptom irritability   Primary Goals: 1) weakness in hip ER  2) weakness in hip abd  3) limited ROM L hip   *asterisks by exercise = given for HEP - [employer] Access Code [email or web address]    Manuals       LAD L hip        Lateral hip mobs                                There Ex        Bike  5 min       Piriformis S*  20" x 4      Figure 4 S*  20" x 4      Gentle hip IR S        HS S  Attempted - pain       Prone quad S  20" x 4      Hip flexor S                Review of HEP  RT      Neuro Re-Ed        Supine clams*  SL w/ no band 2x10 - increasing pain w/ each set/rep       Bridge  2x10      SLR        SL SLR  3x5 - small motion      SLS        Wobble board  1 min fwd & lat      Cup taps        TRX squats        X walks        Monster walks                         Re-evaluation              Ther Act                                         Modalities

## 2022-07-28 ENCOUNTER — OFFICE VISIT (OUTPATIENT)
Dept: PHYSICAL THERAPY | Facility: CLINIC | Age: 52
End: 2022-07-28
Payer: COMMERCIAL

## 2022-07-28 DIAGNOSIS — G89.29 CHRONIC LEFT HIP PAIN: Primary | ICD-10-CM

## 2022-07-28 DIAGNOSIS — M25.552 CHRONIC LEFT HIP PAIN: Primary | ICD-10-CM

## 2022-07-28 PROCEDURE — 97110 THERAPEUTIC EXERCISES: CPT | Performed by: PHYSICAL THERAPIST

## 2022-07-28 PROCEDURE — 97112 NEUROMUSCULAR REEDUCATION: CPT | Performed by: PHYSICAL THERAPIST

## 2022-07-28 PROCEDURE — 97140 MANUAL THERAPY 1/> REGIONS: CPT | Performed by: PHYSICAL THERAPIST

## 2022-07-28 NOTE — PROGRESS NOTES
Daily Note     Today's date: 2022  Patient name: Stevo Avalos  : 1970  MRN: [id number]  Referring provider: Amado Vilchis, PT  Dx:   Encounter Diagnosis     ICD-10-CM    1  Chronic left hip pain  M25 552     G89 29        Start Time: 1545  Stop Time: 1630  Total time in clinic (min): 45 minutes    Subjective: Patient reports that his hip was flared up after each PT session  Says that he typically feels it the day after  Objective: See treatment diary below      Assessment: Tolerated treatment poor  Multiple exercises were held today due to intolerance and pain provocation  Patient had initial relief from LAD, but after about 30 seconds he began to experience pain  His symptom presentation remains consistent with potential acetabular labrum pathology  He has a follow up on Monday with orthopedics  Plan: Continue per plan of care  Progress treatment as tolerated         Diagnosis: L hip pain   Precautions: high symptom irritability   Primary Goals: 1) weakness in hip ER  2) weakness in hip abd  3) limited ROM L hip   *asterisks by exercise = given for [facility] [employer] Access Code [email or web address]    Manuals      LAD L hip   LCB     Lateral hip mobs                                There Ex        Bike  5 min  5 mins     Piriformis S*  20" x 4 20" x 4     Figure 4 S*  20" x 4 20" x 4     Gentle hip IR S        HS S  Attempted - pain       Prone quad S  20" x 4 20" x 4     Hip flexor S        Modified shawn S   1 min     Review of HEP  RT      Neuro Re-Ed        Supine clams*  SL w/ no band 2x10 - increasing pain w/ each set/rep  Deferred- pain     Bridge  2x10 2x10 5"     SLR        SL SLR  3x5 - small motion Deferred- pain last session     SLS   30" 1x on foam     Wobble board  1 min fwd & lat 1 min fwd/lat     Cup taps        TRX squats   Squat to chair 10x, 15#     X walks        Monster walks                         Re-evaluation              Ther Act Modalities

## 2022-08-01 ENCOUNTER — APPOINTMENT (OUTPATIENT)
Dept: PHYSICAL THERAPY | Facility: CLINIC | Age: 52
End: 2022-08-01
Payer: COMMERCIAL

## 2022-08-04 ENCOUNTER — OFFICE VISIT (OUTPATIENT)
Dept: PHYSICAL THERAPY | Facility: CLINIC | Age: 52
End: 2022-08-04
Payer: COMMERCIAL

## 2022-08-04 DIAGNOSIS — M25.552 CHRONIC LEFT HIP PAIN: Primary | ICD-10-CM

## 2022-08-04 DIAGNOSIS — G89.29 CHRONIC LEFT HIP PAIN: Primary | ICD-10-CM

## 2022-08-04 PROCEDURE — 97110 THERAPEUTIC EXERCISES: CPT | Performed by: PHYSICAL THERAPIST

## 2022-08-04 NOTE — PROGRESS NOTES
Discharge     Today's date: 2022  Patient name: Rene Wells  : 1970  MRN: [id number]  Referring provider: Eden Maynard, PT  Dx:   Encounter Diagnosis     ICD-10-CM    1  Chronic left hip pain  M25 552     G89 29        Start Time: 1015  Stop Time: 1053  Total time in clinic (min): 38 minutes    Subjective: Patient reports that he went back to ortho yesterday, who was happy the shot helped but it has been 6 weeks and it is back to square one  He can only get the shot every 3 months  He wishes to get a stretching program and be done with PT     Objective: See treatment diary below     Assessment:  Rene Wells has been compliant with attending physical therapy and completing home exercise program since initial evaluation  Patient provided with updated Home Exercise Program, all questions answered, and verbalized understanding, agreeing to plan of care  Thus it was mutually decided to discontinue this episode of care and transition to Home Exercise Program     Plan: Discharge to gentle stretching HEP       Diagnosis: L hip pain   Precautions: high symptom irritability   Primary Goals: 1) weakness in hip ER  2) weakness in hip abd  3) limited ROM L hip   *asterisks by exercise = given for HEP - [employer] Access Code [email or web address]    Manuals  8/    LAD L hip   LCB     Lateral hip mobs                                There Ex        Bike  5 min  5 mins 5 min    Piriformis S*  20" x 4 20" x 4 20" x 4 supine    Figure 4 S*  20" x 4 20" x 4 20" x 4 seated    Gentle hip IR S        HS S*  Attempted - pain   Seated 20" x 4    Prone quad S*  20" x 4 20" x 4 20" x 4 prone    Hip flexor S*    Standing 20" x 4 sec    Modified shawn S   1 min np    Log roll AROM    X 20 gentle    Doorway lat stretch    4 x 20" hip to L    Review of HEP  RT      Neuro Re-Ed        Supine clams*  SL w/ no band 2x10 - increasing pain w/ each set/rep  Deferred- pain     Bridge  2x10 2x10 5"     SLR        SL SLR  3x5 - small motion Deferred- pain last session     SLS   30" 1x on foam     Wobble board  1 min fwd & lat 1 min fwd/lat     Cup taps        TRX squats   Squat to chair 10x, 15#     X walks        Monster walks                         Re-evaluation        DC to HEP      Ther Act                                         Modalities

## 2022-08-08 ENCOUNTER — APPOINTMENT (OUTPATIENT)
Dept: PHYSICAL THERAPY | Facility: CLINIC | Age: 52
End: 2022-08-08
Payer: COMMERCIAL

## 2022-08-11 ENCOUNTER — APPOINTMENT (OUTPATIENT)
Dept: PHYSICAL THERAPY | Facility: CLINIC | Age: 52
End: 2022-08-11
Payer: COMMERCIAL

## 2022-10-07 ENCOUNTER — APPOINTMENT (OUTPATIENT)
Dept: RADIOLOGY | Facility: CLINIC | Age: 52
End: 2022-10-07
Payer: COMMERCIAL

## 2022-10-07 ENCOUNTER — OFFICE VISIT (OUTPATIENT)
Dept: OBGYN CLINIC | Facility: CLINIC | Age: 52
End: 2022-10-07
Payer: COMMERCIAL

## 2022-10-07 VITALS
BODY MASS INDEX: 26.77 KG/M2 | HEART RATE: 68 BPM | DIASTOLIC BLOOD PRESSURE: 80 MMHG | SYSTOLIC BLOOD PRESSURE: 130 MMHG | WEIGHT: 202 LBS | HEIGHT: 73 IN

## 2022-10-07 DIAGNOSIS — M16.12 PRIMARY OSTEOARTHRITIS OF ONE HIP, LEFT: Primary | ICD-10-CM

## 2022-10-07 DIAGNOSIS — S73.192A TEAR OF LEFT ACETABULAR LABRUM, INITIAL ENCOUNTER: ICD-10-CM

## 2022-10-07 DIAGNOSIS — M25.552 LEFT HIP PAIN: ICD-10-CM

## 2022-10-07 PROCEDURE — 99214 OFFICE O/P EST MOD 30 MIN: CPT | Performed by: ORTHOPAEDIC SURGERY

## 2022-10-07 PROCEDURE — 73502 X-RAY EXAM HIP UNI 2-3 VIEWS: CPT

## 2022-10-07 RX ORDER — LOSARTAN POTASSIUM 25 MG/1
25 TABLET ORAL DAILY
COMMUNITY
Start: 2022-05-01 | End: 2023-07-26

## 2022-10-07 NOTE — PROGRESS NOTES
Assessment/Plan:  1  Primary osteoarthritis of one hip, left     2  Tear of left acetabular labrum, initial encounter  MRI arthrogram left hip    FL injection left hip (arthrogram)   3  Left hip pain  XR hip/pelv 2-3 vws left if performed    MRI arthrogram left hip    FL injection left hip (arthrogram)     Scribe Attestation    I,:  Jena Kurtz am acting as a scribe while in the presence of the attending physician :       I,:  Garcia Woods DO personally performed the services described in this documentation    as scribed in my presence :         Kelli Wright is a pleasant 47year old who presents to the office today for an initial evalaution of his left hip  Upon review of the left hip x-ray's, a thorough history and my examination, Kelli Wright is presenting with signs and symptoms consistent with mild left hip osteoarthritis and an acetabular labrum tear  Since he has failed formal physical therapy along with no relief from the intra-articular left hip injection I recommend an MRI arthrogram of his left hip for further evaluation of the labrum tear  A MRI arthrogram of his left hip was ordered at today's visit  I discussed with the patient that due to his age, a hip arthroscopy for a labrum debridement is not recommended  I discussed with the patient I will call him with the results of the MRI to further delineate his plan of care  He understood and had no further questions  Subjective: Initial evaluation of left hip    Patient ID: Aracelis Cabrera is a 47 y o  male who presents to the office today for an initial evalaution of his left hip  He states that his left hip pain began about 1 year ago with no specific mechanism of injury or trauma  He states that he will experience a clicking sensation about his hip with motion  He denies any groin pain  He states that he will experience daily intermittent sharp pain which he localizes over his lumbar spine and lateral hip   He states that he will experience pain after going for a 5 mile hike  He notes difficulties with getting in and out of a car and putting on socks and shoes  He states that he was seen at [facility] by Dr Braden Estevez who diagnosed him with mild to moderate left hip osteoarthritis  He was prescribed formal physical therapy and an intra-articular hip injection  He saw Dr Elizabeth Canela for the intra-articular hip injection and was told he had a labral tear  He states that he underwent left hip intra-articular injection 3 months ago with only a few days of pain relief  He states that formal physical therapy failed him  He states that he will use Tylenol to help alleviate his pain  He denies any numbness or tingling  He denies any previous back or hip surgeries  Review of Systems   Constitutional: Positive for activity change  Negative for chills, fever and unexpected weight change  HENT: Negative for hearing loss, nosebleeds and sore throat  Eyes: Negative for pain, redness and visual disturbance  Respiratory: Negative for cough, shortness of breath and wheezing  Cardiovascular: Negative for chest pain, palpitations and leg swelling  Gastrointestinal: Negative for abdominal pain, nausea and vomiting  Endocrine: Negative for polyphagia and polyuria  Genitourinary: Negative for dysuria and hematuria  Musculoskeletal: Positive for arthralgias and myalgias  Skin: Negative for rash and wound  Neurological: Negative for dizziness, numbness and headaches  Psychiatric/Behavioral: Negative for decreased concentration  The patient is not nervous/anxious  Past Medical History:   Diagnosis Date    Hypertension 08/2021    Osteoarthritis 08/2021    maybe?  Scotoma     Sleep apnea        History reviewed  No pertinent surgical history      Family History   Problem Relation Age of Onset    No Known Problems Mother     No Known Problems Father     No Known Problems Sister     No Known Problems Brother     No Known Problems Maternal Grandmother     No Known Problems Maternal Grandfather     No Known Problems Paternal Grandmother     No Known Problems Paternal Grandfather        Social History     Occupational History    Not on file   Tobacco Use    Smoking status: Never Smoker    Smokeless tobacco: Never Used   Substance and Sexual Activity    Alcohol use: No    Drug use: No    Sexual activity: Yes     Partners: Female     Birth control/protection: None         Current Outpatient Medications:     losartan (COZAAR) 25 mg tablet, Take 25 mg by mouth daily, Disp: , Rfl:     No Known Allergies    Objective:  Vitals:    10/07/22 0832   BP: 130/80   Pulse: 68       Body mass index is 26 65 kg/m²  Left Hip Exam     Tenderness   The patient is experiencing no tenderness  Range of Motion   Abduction: 50   Adduction: 30   Extension: 0   Flexion: 130   External rotation: 50   Internal rotation: 30     Muscle Strength   Abduction: 5/5   Adduction: 5/5   Flexion: 5/5     Tests   CHAZ: negative    Other   Erythema: absent  Scars: absent  Sensation: normal  Pulse: present    Comments:  Equivocal CHAZ  Positive FADIR  Positive C sign   Neurovascularly intact             Physical Exam  Vitals reviewed  Constitutional:       Appearance: Normal appearance  He is well-developed  HENT:      Head: Normocephalic and atraumatic  Eyes:      Extraocular Movements: Extraocular movements intact  Conjunctiva/sclera: Conjunctivae normal    Cardiovascular:      Rate and Rhythm: Normal rate  Pulmonary:      Effort: Pulmonary effort is normal  No respiratory distress  Musculoskeletal:      Cervical back: Normal range of motion and neck supple  Comments: As noted in HPI   Skin:     General: Skin is warm and dry  Neurological:      Mental Status: He is alert and oriented to person, place, and time  Psychiatric:         Behavior: Behavior normal          I have personally reviewed pertinent films in PACS    X-rays performed in the office today of his left hip demonstrates mild degenerative changes  There are no acute fractures, dislocations, lytic or blastic lesions  This document was created using speech voice recognition software  Grammatical errors, random word insertions, pronoun errors, and incomplete sentences are an occasional consequence of this system due to software limitations, ambient noise, and hardware issues  Any formal questions or concerns about content, text, or information contained within the body of this dictation should be directly addressed to the provider for clarification

## 2023-03-21 ENCOUNTER — TELEPHONE (OUTPATIENT)
Dept: SLEEP CENTER | Facility: CLINIC | Age: 53
End: 2023-03-21

## 2023-03-21 NOTE — TELEPHONE ENCOUNTER
Received message from Brigida Paulino at [facility] ([phone] extension [phone]) stating patient's last script for oxygen was written by Dr Lucero Singleton  They have received new oxygen orders back but they were not signed  Returned call left message for Brigida Paulino and advised that patient is not a patient of the sleep center  She will need to call Dr Merced Mejia private office to address  Phone number provided in message   [phone]

## 2023-05-17 ENCOUNTER — OFFICE VISIT (OUTPATIENT)
Dept: SLEEP CENTER | Facility: CLINIC | Age: 53
End: 2023-05-17

## 2023-05-17 VITALS
HEIGHT: 73 IN | BODY MASS INDEX: 26.64 KG/M2 | HEART RATE: 67 BPM | OXYGEN SATURATION: 98 % | DIASTOLIC BLOOD PRESSURE: 86 MMHG | SYSTOLIC BLOOD PRESSURE: 132 MMHG | WEIGHT: 201 LBS

## 2023-05-17 DIAGNOSIS — E66.3 OVERWEIGHT (BMI 25.0-29.9): ICD-10-CM

## 2023-05-17 DIAGNOSIS — G47.33 OSA (OBSTRUCTIVE SLEEP APNEA): Primary | ICD-10-CM

## 2023-05-17 DIAGNOSIS — I10 ESSENTIAL HYPERTENSION: ICD-10-CM

## 2023-05-17 NOTE — PATIENT INSTRUCTIONS

## 2023-05-17 NOTE — PROGRESS NOTES
Consultation - [address] [place] [address]  47 y o  male  VCN:[id number]  SRN:[id number]  DOS:5/17/2023    Physician Requesting Consult: An Smith MD             Reason for Consult :  I saw Jd Cabrera for initial sleep evaluation today  He was diagnosed with obstructive sleep apnea in 2014 and has been using CPAP since  He got a replacement ResMed machine around 18 months ago  He is here today because he needs prescription for supplies  HST in 2014 demonstrated a respiratory event index of 27/h  Minimum oxygen saturation was 81% and 2 8% of the study spent with saturations below 90%  During the subsequent therapeutic study, sleep disordered breathing was adequately remediated with CPAP at 11 cm H2O  PFSH, Problem List, Medications & Allergies were reviewed in EMR  Martin Coronel  has a past medical history of Hypertension (08/2021), Osteoarthritis (08/2021), Scotoma, and Sleep apnea  He has a current medication list which includes the following prescription(s): losartan  HPI: He is using CPAP regularly and benefits from use  Patient reports has not been using [employer] to sanitize the machine  He is experiencing no adverse effects  Compliance data shows use for more than 4 hours 80% of the time  Respiratory event index is 2 2/h at 95th percentile pressure of 11 cm H2O  Other Complaints: None  Restless Leg Syndrome: reports no suggestive symptoms  Parasomnia: no features reported    Sleep Routine (on average): He is getting around 6 hours sleep  He reports no difficulty initiating or maintaining sleep  Awakens: needing an alarm  Upon awakening: feels refreshed  Martin Coronel denies excessive daytime sleepiness  He rated [himself] at Total score: 0 /24 on the Gail Sleepiness Scale  Habits:   reports that he has never smoked  He has never used smokeless tobacco ;  reports no history of alcohol use ;[ reports no history of drug use  ];[  E-Cigarette/Vaping   ];  Caffeine use:limited; "Exercise routine: regular  Family History: Negative for sleep disturbance  ROS: Significant for weight has been stable  A 10 point review of systems was otherwise negative  Wilfred Majano EXAM:  /86 (BP Location: Right arm, Patient Position: Sitting, Cuff Size: Standard)   Pulse 67   Ht 6' 1\" (1 854 m)   Wt 91 2 kg (201 lb)   SpO2 98%   BMI 26 52 kg/m²    General: Well groomed male, well appearing, in no apparent distress  Neurological: Alert and orientated; cooperative; Cranial nerves intact;    Psychiatric: Speech: Clear and coherent; normal mood, affect & thought   Skin: Warm and dry; Color& Hydration good; no facial rashes or lesions   HEENT:  Craniofacial anatomy: normal Sinuses: Non-tender  TMJ: Normal   Eyes: EOM's intact; conjunctiva/corneas clear   Ears: Externally appear normal     Nasal Airway: is patent Septum: Intact; Mucosa: Normal; Turbinates: Normal; Rhinorrhea: None  Mouth: Lips: Normal posture; Dentition: normal   Mucosa: Moist; Hard Palate:normal    Oropharryx: crowded and AP narrowing Tongue: Mallampati:Class IV and MobileSoft Palate:  redundant  Tonsils: absent  Neck:; [  \";] Supple; no abnormal masses; Thyroid: Normal  Trachea: Central     Lymph: No cervical or submandibular Lymhadenopathy  Heart: S1,S2 normal; RRR; no gallop; no murmur s  Lungs: Respiratory Effort: Normal  Air entry good bilaterally  No wheezes  No rales  Abdomen: Obese, soft & non-tender    Extremities: No pedal edema  No clubbing or cyanosis  Musculoskeletal:  Motor normal; Gait: Normal        IMPRESSION: Primary/Secondary Sleep Diagnoses (to Medical or Psych conditions) & Comorbidities   1  BRIAN (obstructive sleep apnea)  PAP DME Pressure Change    PAP DME Resupply/Reorder      2  Essential hypertension        3  Overweight (BMI 25 0-29  9)             PLAN:  1  I reviewed results of the Sleep study with the patient     2  With respect to above conditions, I counseled on pathophysiology, diagnosis, treatment " "options, risks and benefits; inter-relationship and effects on symptoms and comorbidities; risks of no treatment; costs and insurance aspects  3  Patient elected to continue positive airway pressure therapy and it is medically necessary  Pressure setting: 10 to 14 cm H2O    4  I also advised on weight maintenance  5  Follow-up to be scheduled after the study to review results and to initiate therapy  Sincerely,      Authenticated electronically on 33/85/92   Board Certified Specialist     Portions of the record may have been created with voice recognition software  Occasional wrong word or \"sound a like\" substitutions may have occurred due to the inherent limitations of voice recognition software  There may also be notations and random deletions of words or characters from malfunctioning software  Read the chart carefully and recognize, using context, where substitutions/deletions have occurred       "

## 2023-05-18 ENCOUNTER — TELEPHONE (OUTPATIENT)
Dept: SLEEP CENTER | Facility: CLINIC | Age: 53
End: 2023-05-18

## 2023-05-18 LAB

## 2023-05-18 NOTE — TELEPHONE ENCOUNTER
Rx for pressure change, Rx for CPAP supplies and clinical note sent to [address] [place] [address]

## 2023-05-24 ENCOUNTER — TELEPHONE (OUTPATIENT)
Dept: SLEEP CENTER | Facility: CLINIC | Age: 53
End: 2023-05-24

## 2023-05-24 NOTE — TELEPHONE ENCOUNTER
Received fax from [facility] requesting patient's office visit note prior to sleep study, sleep studies, and office note after 3 months of CPAP therapy  Fax to [phone]  Copy of 2014 diagnostic sleep study on chart but no notes available prior to this study  Also no notes from 3 months after therapy started  Faxed old sleep studies to [facility]  Also sent office note from Dr Vianey Hines private office from 2021 and current office note from 5/17/23

## 2023-10-18 ENCOUNTER — TELEPHONE (OUTPATIENT)
Age: 53
End: 2023-10-18

## 2023-10-18 NOTE — TELEPHONE ENCOUNTER
Rec'd call from patient requesting NEW for FULL BODY/ MOLE CHECK - SOME CHANGING MOLES    Explained wait/cancellation list processes and MyChart notification. Understanding was verbalized. Created wait/cancellation list for patient.  HIGH PRIORITY

## 2024-04-26 ENCOUNTER — APPOINTMENT (OUTPATIENT)
Dept: RADIOLOGY | Facility: CLINIC | Age: 54
End: 2024-04-26
Payer: COMMERCIAL

## 2024-04-26 ENCOUNTER — OFFICE VISIT (OUTPATIENT)
Dept: OBGYN CLINIC | Facility: CLINIC | Age: 54
End: 2024-04-26
Payer: COMMERCIAL

## 2024-04-26 VITALS
HEART RATE: 66 BPM | BODY MASS INDEX: 25.71 KG/M2 | WEIGHT: 194 LBS | DIASTOLIC BLOOD PRESSURE: 89 MMHG | SYSTOLIC BLOOD PRESSURE: 132 MMHG | HEIGHT: 73 IN

## 2024-04-26 DIAGNOSIS — M16.12 PRIMARY OSTEOARTHRITIS OF ONE HIP, LEFT: ICD-10-CM

## 2024-04-26 DIAGNOSIS — M16.12 PRIMARY OSTEOARTHRITIS OF ONE HIP, LEFT: Primary | ICD-10-CM

## 2024-04-26 DIAGNOSIS — M25.552 LEFT HIP PAIN: ICD-10-CM

## 2024-04-26 PROCEDURE — 73502 X-RAY EXAM HIP UNI 2-3 VIEWS: CPT

## 2024-04-26 PROCEDURE — 99214 OFFICE O/P EST MOD 30 MIN: CPT | Performed by: ORTHOPAEDIC SURGERY

## 2024-04-26 RX ORDER — SILDENAFIL 100 MG/1
100 TABLET, FILM COATED ORAL
COMMUNITY
Start: 2024-04-01

## 2024-04-26 RX ORDER — ONDANSETRON 4 MG/1
4 TABLET, FILM COATED ORAL DAILY PRN
COMMUNITY

## 2024-04-26 RX ORDER — BUPROPION HYDROCHLORIDE 150 MG/1
1 TABLET ORAL EVERY MORNING
COMMUNITY
Start: 2024-03-14 | End: 2025-03-14

## 2024-04-26 RX ORDER — SERTRALINE HYDROCHLORIDE 25 MG/1
TABLET, FILM COATED ORAL
COMMUNITY
Start: 2024-02-22

## 2024-04-26 NOTE — PROGRESS NOTES
Assessment/Plan:  1. Primary osteoarthritis of one hip, left  XR hip/pelv 2-3 vws left if performed    MRI arthrogram left hip    FL injection left hip (arthrogram)      2. Left hip pain  MRI arthrogram left hip    FL injection left hip (arthrogram)        Scribe Attestation      I,:  Omar Cedeno PA-C am acting as a scribe while in the presence of the attending physician.:       I,:  Jonathan Hoffman DO personally performed the services described in this documentation    as scribed in my presence.:           Rajat is a pleasant 53-year-old gentleman presenting today for follow-up of his activity related left hip and groin pain.  Updated imaging today demonstrates that there has not been significant advancement in his degenerative changes, which remain mild in nature.  Given the persistence of his symptoms and failure of conservative treatment with Tylenol, NSAIDs, physical therapy and home exercises, maintenance of appropriate weight, and an intra-articular cortisone injection, we do have concern for a labral tear.  We have ordered an MR arthrogram of his left hip to evaluate for possible labral tear or other internal derangement.  We will call him with the results of the study to further delineate his plan of care.  He understands that we would refer him to Dr. Mchugh for consideration of hip arthroscopy if a labral Alecia is present.  He expressed understanding all of his questions were addressed today    Subjective: Left hip follow-up    Patient ID: Rajat De La Torre is a 53 y.o. male presenting today for follow-up of his left hip.  He reports that he continues to have activity related discomfort in the left hip and groin that is dull and chronic in nature.  It has not gotten any better and in fact has become more frequent over the past 18 months.  He did not go for the MR arthrogram as ordered from his last appointment.  He did undergo a cortisone injection, which provided only 1 weeks worth of  relief.  He has continued with the home exercise program that he learned from physical therapy, but it has not provided significant benefit    Review of Systems   Constitutional:  Positive for activity change.   HENT: Negative.     Eyes: Negative.    Respiratory: Negative.     Cardiovascular: Negative.    Gastrointestinal: Negative.    Endocrine: Negative.    Genitourinary: Negative.    Musculoskeletal:  Positive for arthralgias, gait problem, joint swelling and myalgias.   Skin: Negative.    Allergic/Immunologic: Negative.    Hematological: Negative.    Psychiatric/Behavioral: Negative.       Past Medical History:   Diagnosis Date    Hypertension 08/2021    Osteoarthritis 08/2021    maybe?    Scotoma     Sleep apnea      History reviewed. No pertinent surgical history.    Family History   Problem Relation Age of Onset    No Known Problems Mother     No Known Problems Father     No Known Problems Sister     No Known Problems Brother     No Known Problems Maternal Grandmother     No Known Problems Maternal Grandfather     No Known Problems Paternal Grandmother     No Known Problems Paternal Grandfather        Social History     Occupational History    Not on file   Tobacco Use    Smoking status: Never    Smokeless tobacco: Never   Substance and Sexual Activity    Alcohol use: No    Drug use: No    Sexual activity: Yes     Partners: Female     Birth control/protection: None         Current Outpatient Medications:     buPROPion (WELLBUTRIN XL) 150 mg 24 hr tablet, Take 1 tablet by mouth every morning, Disp: , Rfl:     ondansetron (ZOFRAN) 4 mg tablet, Take 4 mg by mouth daily as needed, Disp: , Rfl:     sertraline (ZOLOFT) 25 mg tablet, , Disp: , Rfl:     sildenafil (VIAGRA) 100 mg tablet, Take 100 mg by mouth, Disp: , Rfl:     losartan (COZAAR) 25 mg tablet, Take 25 mg by mouth daily (Patient not taking: Reported on 5/17/2023), Disp: , Rfl:     No Known Allergies    Objective:  Vitals:    04/26/24 1459   BP: 132/89    Pulse: 66       Body mass index is 25.6 kg/m².    Left Hip Exam     Tenderness   The patient is experiencing no tenderness.     Range of Motion   Abduction:  50 normal   Adduction:  30 normal   Extension:  0 normal   Flexion:  130 normal   External rotation:  50 normal   Internal rotation: 30 normal     Muscle Strength   Abduction: 5/5   Adduction: 5/5   Flexion: 5/5     Tests   CHAZ: positive  Edgar: negative    Other   Erythema: absent  Scars: absent  Sensation: normal  Pulse: present    Comments:  Positive CHAZ  Positive FADIR  Positive C sign   Neurovascularly intact   Pain at end range of abduction and abduction            Physical Exam  Vitals and nursing note reviewed.   Constitutional:       Appearance: Normal appearance. He is well-developed.      Comments: Body mass index is 25.6 kg/m².   HENT:      Head: Normocephalic and atraumatic.      Right Ear: External ear normal.      Left Ear: External ear normal.   Eyes:      Extraocular Movements: Extraocular movements intact.      Conjunctiva/sclera: Conjunctivae normal.   Cardiovascular:      Rate and Rhythm: Normal rate.      Pulses: Normal pulses.   Pulmonary:      Effort: Pulmonary effort is normal.   Abdominal:      Palpations: Abdomen is soft.   Musculoskeletal:      Cervical back: Normal range of motion.      Comments: See ortho exam   Skin:     General: Skin is warm and dry.   Neurological:      General: No focal deficit present.      Mental Status: He is alert and oriented to person, place, and time. Mental status is at baseline.   Psychiatric:         Mood and Affect: Mood normal.         Behavior: Behavior normal.         Thought Content: Thought content normal.         Judgment: Judgment normal.         I have personally reviewed pertinent films in PACS of the updated x-rays taken today of his left hip and compare them to previous films.  He does have minor degenerative changes with medial joint space narrowing.  There is no significant  advancement from his degenerative changes in the left hip from 18 months ago.  There is no evidence of fracture, dislocation, or avascular necrosis.     This document was created using speech voice recognition software.   Grammatical errors, random word insertions, pronoun errors, and incomplete sentences are an occasional consequence of this system due to software limitations, ambient noise, and hardware issues.   Any formal questions or concerns about content, text, or information contained within the body of this dictation should be directly addressed to the provider for clarification.

## 2024-05-01 ENCOUNTER — APPOINTMENT (OUTPATIENT)
Dept: RADIOLOGY | Facility: CLINIC | Age: 54
End: 2024-05-01
Payer: COMMERCIAL

## 2024-05-01 ENCOUNTER — OFFICE VISIT (OUTPATIENT)
Dept: OBGYN CLINIC | Facility: CLINIC | Age: 54
End: 2024-05-01
Payer: COMMERCIAL

## 2024-05-01 VITALS
BODY MASS INDEX: 25.71 KG/M2 | SYSTOLIC BLOOD PRESSURE: 129 MMHG | DIASTOLIC BLOOD PRESSURE: 87 MMHG | HEIGHT: 73 IN | WEIGHT: 194 LBS | HEART RATE: 61 BPM

## 2024-05-01 DIAGNOSIS — M25.512 LEFT SHOULDER PAIN, UNSPECIFIED CHRONICITY: Primary | ICD-10-CM

## 2024-05-01 DIAGNOSIS — M25.512 LEFT SHOULDER PAIN, UNSPECIFIED CHRONICITY: ICD-10-CM

## 2024-05-01 PROCEDURE — 99214 OFFICE O/P EST MOD 30 MIN: CPT | Performed by: ORTHOPAEDIC SURGERY

## 2024-05-01 PROCEDURE — 73030 X-RAY EXAM OF SHOULDER: CPT

## 2024-05-01 RX ORDER — LOSARTAN POTASSIUM 100 MG/1
TABLET ORAL
COMMUNITY
Start: 2024-03-22

## 2024-05-03 ENCOUNTER — OFFICE VISIT (OUTPATIENT)
Dept: URGENT CARE | Facility: CLINIC | Age: 54
End: 2024-05-03
Payer: COMMERCIAL

## 2024-05-03 VITALS
OXYGEN SATURATION: 99 % | HEART RATE: 83 BPM | BODY MASS INDEX: 25.71 KG/M2 | TEMPERATURE: 97.5 F | SYSTOLIC BLOOD PRESSURE: 133 MMHG | RESPIRATION RATE: 12 BRPM | HEIGHT: 73 IN | DIASTOLIC BLOOD PRESSURE: 88 MMHG | WEIGHT: 194 LBS

## 2024-05-03 DIAGNOSIS — J06.9 UPPER RESPIRATORY TRACT INFECTION, UNSPECIFIED TYPE: Primary | ICD-10-CM

## 2024-05-03 PROCEDURE — G0382 LEV 3 HOSP TYPE B ED VISIT: HCPCS | Performed by: NURSE PRACTITIONER

## 2024-05-03 PROCEDURE — S9083 URGENT CARE CENTER GLOBAL: HCPCS | Performed by: NURSE PRACTITIONER

## 2024-05-03 RX ORDER — BROMPHENIRAMINE MALEATE, PSEUDOEPHEDRINE HYDROCHLORIDE, AND DEXTROMETHORPHAN HYDROBROMIDE 2; 30; 10 MG/5ML; MG/5ML; MG/5ML
10 SYRUP ORAL 3 TIMES DAILY PRN
Qty: 150 ML | Refills: 0 | Status: SHIPPED | OUTPATIENT
Start: 2024-05-03

## 2024-05-03 NOTE — PROGRESS NOTES
[facility]        NAME: Rajat De La Torre is a 53 y.o. male  : 1970    MRN: [id number]  DATE: May 3, 2024  TIME: 2:45 PM    Assessment and Plan   Upper respiratory tract infection, unspecified type [J06.9]  1. Upper respiratory tract infection, unspecified type  brompheniramine-pseudoephedrine-DM 30-2-10 MG/5ML syrup            Patient Instructions     Plenty of rest  Increase fluids and adequate nutrition  Take med as prescribed   Follow up with PCP in 3-5 days.  Proceed to  ER if symptoms worsen.    If tests have been performed at [facility], our office will contact you with results if changes need to be made to the care plan discussed with you at the visit.  [facility]    Chief Complaint     Chief Complaint   Patient presents with    Cold Like Symptoms     URI s/s x 1 week with fatigue         History of Present Illness       HPI  Reports upper resp symptoms x 5 days. Includes fatigue. Cough, sneezing. Was in close contact with someone diagnosed with mononucleosis. Main problems is fatigue.     Review of Systems   Review of Systems   Constitutional:  Positive for fatigue. Negative for chills.   HENT:  Positive for rhinorrhea and sneezing.    Respiratory:  Positive for cough. Negative for chest tightness, shortness of breath and wheezing.          Current Medications       Current Outpatient Medications:     brompheniramine-pseudoephedrine-DM 30-2-10 MG/5ML syrup, Take 10 mL by mouth 3 (three) times a day as needed for congestion or cough, Disp: 150 mL, Rfl: 0    buPROPion (WELLBUTRIN XL) 150 mg 24 hr tablet, Take 1 tablet by mouth every morning, Disp: , Rfl:     losartan (COZAAR) 100 MG tablet, , Disp: , Rfl:     losartan (COZAAR) 25 mg tablet, Take 25 mg by mouth daily (Patient not taking: Reported on 2023), Disp: , Rfl:     ondansetron (ZOFRAN) 4 mg tablet, Take 4 mg by mouth daily as needed (Patient not taking: Reported on 2024), Disp: , Rfl:  "    sertraline (ZOLOFT) 25 mg tablet, , Disp: , Rfl:     sildenafil (VIAGRA) 100 mg tablet, Take 100 mg by mouth (Patient not taking: Reported on 5/1/2024), Disp: , Rfl:     Current Allergies     Allergies as of 05/03/2024    (No Known Allergies)            The following portions of the patient's history were reviewed and updated as appropriate: allergies, current medications, past family history, past medical history, past social history, past surgical history and problem list.     Past Medical History:   Diagnosis Date    Hypertension 08/2021    Osteoarthritis 08/2021    maybe?    Scotoma     Sleep apnea        History reviewed. No pertinent surgical history.    Family History   Problem Relation Age of Onset    No Known Problems Mother     No Known Problems Father     No Known Problems Sister     No Known Problems Brother     No Known Problems Maternal Grandmother     No Known Problems Maternal Grandfather     No Known Problems Paternal Grandmother     No Known Problems Paternal Grandfather          Medications have been verified.        Objective   /88   Pulse 83   Temp 97.5 °F (36.4 °C)   Resp 12   Ht 6' 1\" (1.854 m)   Wt 88 kg (194 lb)   SpO2 99%   BMI 25.60 kg/m²   No LMP for male patient.       Physical Exam     Physical Exam  Constitutional:       General: He is not in acute distress.  HENT:      Right Ear: Tympanic membrane normal.      Left Ear: Tympanic membrane normal.      Nose: Rhinorrhea present.      Mouth/Throat:      Pharynx: No posterior oropharyngeal erythema.   Cardiovascular:      Rate and Rhythm: Regular rhythm.      Heart sounds: Normal heart sounds.   Pulmonary:      Effort: Pulmonary effort is normal.      Breath sounds: Normal breath sounds. No wheezing.                   "

## 2024-05-03 NOTE — PROGRESS NOTES
Assessment/Plan:  1. Left shoulder pain, unspecified chronicity  XR shoulder 2+ vw left        Scribe Attestation    I,:   am acting as a scribe while in the presence of the attending physician.:       I,:   personally performed the services described in this documentation    as scribed in my presence.:             53-year-old male presents for evaluation of left shoulder and left arm.  His biggest complaint is some very intermittent pain to his radial forearm with straightening it out.  He most likely has some brachioradialis tendinitis or myositis.  There is not appear to be any neurologic component to it.  He does have some mild shoulder pain from time to time.  At this time we will start conservatively.  He will try Voltaren gel to the left forearm.  We discussed imaging in the future if he fails to improve.  He is got follow-up for his left hip after MRI.  We should get a Simeon lateral x-ray view at that visit as well.  All questions were answered today.    Subjective:   Rajat De La Torre is a 53 y.o. male who presents for evaluation of left shoulder and left arm.  He is also referred to me for his left hip.  For his left hip he said pain for years.  Pain is mostly in his groin but does develop in a C-shaped pattern around his hip.  He has tried physical therapy, NSAIDs, cortisone injection.  He has a MRI scheduled for the end of the month.  Plan to see him back after his MRI.    For the left shoulder and arm.  He is recently developed some intermittent pain.  Shoulder pain is lateral about the shoulder.  He develops arm pain when reaching down which is on the radial side of his forearm along his brachial radialis that can be severe but is very intermittent only from time to time just when reaching his arm straight down [place].  Denies any numbness or tingling.  Denies any neck pain.  No treatment thus far.      Review of Systems   Constitutional: Negative.  Negative for chills and fever.   HENT: Negative.   Negative for ear pain and sore throat.    Eyes: Negative.  Negative for pain and redness.   Respiratory: Negative.  Negative for shortness of breath and wheezing.    Cardiovascular:  Negative for chest pain and palpitations.   Gastrointestinal: Negative.  Negative for abdominal pain and blood in stool.   Endocrine: Negative.  Negative for polydipsia and polyuria.   Genitourinary: Negative.  Negative for difficulty urinating and dysuria.   Musculoskeletal:         As noted in HPI   Skin: Negative.  Negative for pallor and rash.   Neurological: Negative.  Negative for dizziness and numbness.   Hematological: Negative.  Negative for adenopathy. Does not bruise/bleed easily.   Psychiatric/Behavioral: Negative.  Negative for confusion and suicidal ideas.          Past Medical History:   Diagnosis Date   • Hypertension 08/2021   • Osteoarthritis 08/2021    maybe?   • Scotoma    • Sleep apnea        History reviewed. No pertinent surgical history.    Family History   Problem Relation Age of Onset   • No Known Problems Mother    • No Known Problems Father    • No Known Problems Sister    • No Known Problems Brother    • No Known Problems Maternal Grandmother    • No Known Problems Maternal Grandfather    • No Known Problems Paternal Grandmother    • No Known Problems Paternal Grandfather        Social History     Occupational History   • Not on file   Tobacco Use   • Smoking status: Never   • Smokeless tobacco: Never   Substance and Sexual Activity   • Alcohol use: No   • Drug use: No   • Sexual activity: Yes     Partners: Female     Birth control/protection: None         Current Outpatient Medications:   •  losartan (COZAAR) 100 MG tablet, , Disp: , Rfl:   •  buPROPion (WELLBUTRIN XL) 150 mg 24 hr tablet, Take 1 tablet by mouth every morning, Disp: , Rfl:   •  losartan (COZAAR) 25 mg tablet, Take 25 mg by mouth daily (Patient not taking: Reported on 5/17/2023), Disp: , Rfl:   •  ondansetron (ZOFRAN) 4 mg tablet, Take 4 mg  by mouth daily as needed (Patient not taking: Reported on 5/1/2024), Disp: , Rfl:   •  sertraline (ZOLOFT) 25 mg tablet, , Disp: , Rfl:   •  sildenafil (VIAGRA) 100 mg tablet, Take 100 mg by mouth (Patient not taking: Reported on 5/1/2024), Disp: , Rfl:     No Known Allergies    Objective:  Vitals:    05/01/24 1618   BP: 129/87   Pulse: 61       Right Shoulder Exam     Range of Motion   Active abduction:  160   Passive abduction:  170   Extension:  40   External rotation:  70   Forward flexion:  170   Internal rotation 0 degrees:  T7   Internal rotation 90 degrees:  60     Muscle Strength   Abduction: 5/5   Internal rotation: 5/5   External rotation: 5/5   Supraspinatus: 5/5   Subscapularis: 5/5   Biceps: 5/5     Tests   Apprehension: negative  Meléndez test: negative  Cross arm: negative  Impingement: negative  Drop arm: negative  Sulcus: absent    Other   Erythema: absent  Scars: absent  Sensation: normal  Pulse: present      Left Shoulder Exam     Range of Motion   Active abduction:  160   Passive abduction:  170   Extension:  40   External rotation:  70   Forward flexion:  170   Internal rotation 0 degrees:  T7   Internal rotation 90 degrees:  60     Muscle Strength   Abduction: 5/5   Internal rotation: 5/5   External rotation: 5/5   Supraspinatus: 5/5   Subscapularis: 5/5   Biceps: 5/5     Tests   Apprehension: negative  Meléndez test: negative  Cross arm: negative  Impingement: negative  Sulcus: absent    Other   Erythema: absent  Scars: absent  Sensation: normal  Pulse: present     Comments:  Mild tenderness palpation about his brachial radialis.  Pain with reaching down and straightening his arm all the brachioradialis.  No tenderness palpation of the lateral epicondyle.  No tenderness palpation about the medial epicondyle.  Negative Tinel at elbow            Physical Exam  Constitutional:       General: He is not in acute distress.     Appearance: He is well-developed.   HENT:      Head: Normocephalic and  atraumatic.   Eyes:      General: No scleral icterus.     Conjunctiva/sclera: Conjunctivae normal.   Neck:      Vascular: No JVD.   Cardiovascular:      Rate and Rhythm: Normal rate.   Pulmonary:      Effort: Pulmonary effort is normal. No respiratory distress.   Musculoskeletal:      Comments: As per HPI   Skin:     General: Skin is warm.   Neurological:      Mental Status: He is alert and oriented to person, place, and time.      Coordination: Coordination normal.         I have personally reviewed pertinent films in PACS and my interpretation is as follows:  AP pelvis and hip x-rays from 4/26/24 reviewed which show no significant degenerative disease, small cam lesion, although difficult to decipher given no Simeon lateral.    Left shoulder x-rays, 3 views were performed which show no acute osseous abnormalities.      This document was created using speech voice recognition software.   Grammatical errors, random word insertions, pronoun errors, and incomplete sentences are an occasional consequence of this system due to software limitations, ambient noise, and hardware issues.   Any formal questions or concerns about content, text, or information contained within the body of this dictation should be directly addressed to the provider for clarification.

## 2024-05-29 ENCOUNTER — HOSPITAL ENCOUNTER (OUTPATIENT)
Dept: RADIOLOGY | Facility: HOSPITAL | Age: 54
Discharge: HOME | End: 2024-05-29
Payer: COMMERCIAL

## 2024-05-29 ENCOUNTER — HOSPITAL ENCOUNTER (OUTPATIENT)
Dept: MRI IMAGING | Facility: HOSPITAL | Age: 54
Discharge: HOME | End: 2024-05-29
Payer: COMMERCIAL

## 2024-05-29 DIAGNOSIS — M16.12 PRIMARY OSTEOARTHRITIS OF ONE HIP, LEFT: ICD-10-CM

## 2024-05-29 DIAGNOSIS — M25.552 LEFT HIP PAIN: ICD-10-CM

## 2024-05-29 PROCEDURE — 77002 NEEDLE LOCALIZATION BY XRAY: CPT

## 2024-05-29 PROCEDURE — A9585 GADOBUTROL INJECTION: HCPCS | Performed by: PHYSICIAN ASSISTANT

## 2024-05-29 PROCEDURE — 27093 INJECTION FOR HIP X-RAY: CPT

## 2024-05-29 PROCEDURE — 73722 MRI JOINT OF LWR EXTR W/DYE: CPT

## 2024-05-29 RX ORDER — SODIUM CHLORIDE 9 MG/ML
50 INJECTION INTRAVENOUS
Status: COMPLETED | OUTPATIENT
Start: 2024-05-29 | End: 2024-05-29

## 2024-05-29 RX ORDER — LIDOCAINE HYDROCHLORIDE 10 MG/ML
5 INJECTION, SOLUTION EPIDURAL; INFILTRATION; INTRACAUDAL; PERINEURAL
Status: COMPLETED | OUTPATIENT
Start: 2024-05-29 | End: 2024-05-29

## 2024-05-29 RX ORDER — GADOBUTROL 604.72 MG/ML
0.2 INJECTION INTRAVENOUS
Status: COMPLETED | OUTPATIENT
Start: 2024-05-29 | End: 2024-05-29

## 2024-05-29 RX ADMIN — IOHEXOL 5 ML: 300 INJECTION, SOLUTION INTRAVENOUS at 15:05

## 2024-05-29 RX ADMIN — SODIUM CHLORIDE 10 ML: 9 INJECTION, SOLUTION INTRAMUSCULAR; INTRAVENOUS; SUBCUTANEOUS at 15:05

## 2024-05-29 RX ADMIN — LIDOCAINE HYDROCHLORIDE 4 ML: 10 INJECTION, SOLUTION EPIDURAL; INFILTRATION; INTRACAUDAL; PERINEURAL at 15:05

## 2024-05-29 RX ADMIN — GADOBUTROL 0.2 ML: 604.72 INJECTION INTRAVENOUS at 15:29

## 2024-06-05 ENCOUNTER — TELEPHONE (OUTPATIENT)
Dept: OBGYN CLINIC | Facility: CLINIC | Age: 54
End: 2024-06-05

## 2024-06-05 NOTE — TELEPHONE ENCOUNTER
Scheduled with Dr. Mchugh  ----- Message from Omar Cedeno PA-C sent at 6/5/2024 12:06 PM EDT -----  Regarding: FW:  Patient does have evidence of a labral tear in his hip.  Please set up an appointment with Dr. Mchugh for consultation to see if he may be a candidate for hip arthroscopy.  He does not need to follow-up with Dr. Hoffman at this time.  Thank you  ----- Message -----  From: Omar Cedeno PA-C  Sent: 6/3/2024   4:45 PM EDT  To: Jonathan Hoffman DO  Subject: FW:                                              ----- Message -----  From: Interface, Radiology Results In  Sent: 6/3/2024   3:28 PM EDT  To: Omar Cedeno PA-C

## 2024-06-19 ENCOUNTER — OFFICE VISIT (OUTPATIENT)
Dept: OBGYN CLINIC | Facility: CLINIC | Age: 54
End: 2024-06-19
Payer: COMMERCIAL

## 2024-06-19 VITALS
HEART RATE: 93 BPM | BODY MASS INDEX: 25.71 KG/M2 | WEIGHT: 194 LBS | HEIGHT: 73 IN | SYSTOLIC BLOOD PRESSURE: 129 MMHG | DIASTOLIC BLOOD PRESSURE: 79 MMHG

## 2024-06-19 DIAGNOSIS — S73.192A TEAR OF LEFT ACETABULAR LABRUM, INITIAL ENCOUNTER: Primary | ICD-10-CM

## 2024-06-19 DIAGNOSIS — M25.852 FEMOROACETABULAR IMPINGEMENT OF LEFT HIP: ICD-10-CM

## 2024-06-19 PROCEDURE — 99213 OFFICE O/P EST LOW 20 MIN: CPT | Performed by: ORTHOPAEDIC SURGERY

## 2024-06-19 NOTE — PROGRESS NOTES
Assessment/Plan:  1. Tear of left acetabular labrum, initial encounter        2. Femoroacetabular impingement of left hip          54-year-old male presents for repeat evaluation and follow-up for left hip as well as MRI review.  The patient has a left acetabular labral tear but also has some mild arthritis of the hip and a CAM lesion. We discussed possible hip arthroscopy at length, however I did explain that results are less predictable given his age and also since he has some arthritic changes about the hip. He is very interested in a hip replacement, however his arthritis may not be progressed enough to be a candidate for this. He would need to discuss this possibility further with Dr. Hoffman, our joint replacement specialist. I also encouraged a second option from another orthopedic surgeon that does hip arthroscopies. He will think about his options and FU as needed.     Subjective:   Rajat De La Torre is a 54 y.o. male who presents today for evaluation of his left hip. He has had over a year of left hip pain and has failed PT and intra-articular corticosteroid injection and thus Dr. Hoffman ordered an MRI which showed a tear of the anterosuperior portion of the acetabular labrum. I am able to view these images today. He notes ongoing groin pain, which is worse with activity and better with rest. He is having difficulty walking to work now due to his hip pain. He notes good sensation of the left lower extremity. He notes constant pain about the hip at this pain and feels his quality of life is being affected by this.       Review of Systems   Constitutional: Negative.  Negative for chills and fever.   HENT: Negative.  Negative for ear pain and sore throat.    Eyes: Negative.  Negative for pain and redness.   Respiratory: Negative.  Negative for shortness of breath and wheezing.    Cardiovascular:  Negative for chest pain and palpitations.   Gastrointestinal: Negative.  Negative for abdominal pain and blood in  stool.   Endocrine: Negative.  Negative for polydipsia and polyuria.   Genitourinary: Negative.  Negative for difficulty urinating and dysuria.   Musculoskeletal:         As noted in HPI   Skin: Negative.  Negative for pallor and rash.   Neurological: Negative.  Negative for dizziness and numbness.   Hematological: Negative.  Negative for adenopathy. Does not bruise/bleed easily.   Psychiatric/Behavioral: Negative.  Negative for confusion and suicidal ideas.          Past Medical History:   Diagnosis Date   • Hypertension 08/2021   • Osteoarthritis 08/2021    maybe?   • Scotoma    • Sleep apnea        Past Surgical History:   Procedure Laterality Date   • FL INJECTION LEFT HIP (ARTHROGRAM)  5/29/2024       Family History   Problem Relation Age of Onset   • No Known Problems Mother    • No Known Problems Father    • No Known Problems Sister    • No Known Problems Brother    • No Known Problems Maternal Grandmother    • No Known Problems Maternal Grandfather    • No Known Problems Paternal Grandmother    • No Known Problems Paternal Grandfather        Social History     Occupational History   • Not on file   Tobacco Use   • Smoking status: Never   • Smokeless tobacco: Never   Substance and Sexual Activity   • Alcohol use: No   • Drug use: No   • Sexual activity: Yes     Partners: Female     Birth control/protection: None         Current Outpatient Medications:   •  brompheniramine-pseudoephedrine-DM 30-2-10 MG/5ML syrup, Take 10 mL by mouth 3 (three) times a day as needed for congestion or cough, Disp: 150 mL, Rfl: 0  •  buPROPion (WELLBUTRIN XL) 150 mg 24 hr tablet, Take 1 tablet by mouth every morning, Disp: , Rfl:   •  losartan (COZAAR) 100 MG tablet, , Disp: , Rfl:   •  losartan (COZAAR) 25 mg tablet, Take 25 mg by mouth daily (Patient not taking: Reported on 5/17/2023), Disp: , Rfl:   •  ondansetron (ZOFRAN) 4 mg tablet, Take 4 mg by mouth daily as needed (Patient not taking: Reported on 5/1/2024), Disp: , Rfl:    •  sertraline (ZOLOFT) 25 mg tablet, , Disp: , Rfl:   •  sildenafil (VIAGRA) 100 mg tablet, Take 100 mg by mouth (Patient not taking: Reported on 5/1/2024), Disp: , Rfl:     Allergies   Allergen Reactions   • Nsaids Hypertension     hypertension       Objective:  Vitals:    06/19/24 1404   BP: 129/79   Pulse: 93     Pain Score:   5      Ortho Exam    Left Hip Exam      Tenderness   The patient is experiencing no tenderness.      Range of Motion   Abduction:  50 normal   Adduction:  30 normal   Extension:  0 normal   Flexion:  130 normal   External rotation:  50 normal   Internal rotation: 30 normal      Muscle Strength   Abduction: 5/5   Adduction: 5/5   Flexion: 5/5      Tests   CHAZ: positive  Edgar: negative     Other   Erythema: absent  Scars: absent  Sensation: normal  Pulse: present     Comments:  Positive CHAZ  Positive FADIR  Positive C sign   Neurovascularly intact   Pain at end range of abduction and abduction    Physical Exam  Constitutional:       General: He is not in acute distress.     Appearance: He is well-developed.   HENT:      Head: Normocephalic and atraumatic.   Eyes:      General: No scleral icterus.     Conjunctiva/sclera: Conjunctivae normal.   Neck:      Vascular: No JVD.   Cardiovascular:      Rate and Rhythm: Normal rate.   Pulmonary:      Effort: Pulmonary effort is normal. No respiratory distress.   Musculoskeletal:      Comments: As per HPI   Skin:     General: Skin is warm.   Neurological:      Mental Status: He is alert and oriented to person, place, and time.      Coordination: Coordination normal.         I have personally reviewed pertinent films in PACS and my interpretation is as follows:  MRI left hip: CAM lesion. Mild arthritic changes. Anterosuperior portion of the left acetabular labrum.       This document was created using speech voice recognition software.   Grammatical errors, random word insertions, pronoun errors, and incomplete sentences are an occasional  consequence of this system due to software limitations, ambient noise, and hardware issues.   Any formal questions or concerns about content, text, or information contained within the body of this dictation should be directly addressed to the provider for clarification.

## 2024-09-03 ENCOUNTER — TELEPHONE (OUTPATIENT)
Dept: NEUROLOGY | Facility: CLINIC | Age: 54
End: 2024-09-03

## 2024-09-09 NOTE — PROGRESS NOTES
Ambulatory Visit  Name: Rajat De La Torre      : 1970      MRN: [id number]  Encounter Provider: DIMITRIOS Nuno  Encounter Date: 9/10/2024   Encounter department: NEUROLOGY [facility]    Assessment & Plan   1. New persistent daily headache  Assessment & Plan:  He endorses history of ocular migraine where he would get the aura but would not get the head pain.  This occurred maybe 5 times in his lifetime.  He does report being more stressed over the past 6 months but not sure if it's related.  He does experience a persistent daily headache which started about 2 to 3 months ago which he describes as a dull ache in the left occiput which travels to the vertex.  They do not appear to be positional in nature.  He does not have associated symptoms suspicious for migraine or cluster headache.  The scalp is not tender to the touch in fact applying pressure to the area makes it feel better.  The headaches are about a 3-4 out of 10 and are not terribly painful but are nagging.  He has not had any recent workup for neuroimaging.  He is not interested in starting a daily preventative medication at this time, however, his wife has chronic migraines and is treated with Topamax.  He avoids NSAID medications secondary to his hypertension on losartan.  He takes over-the-counter Tylenol when he experiences the head pain but it is minimally effective.  Workup:  Due to increased frequency and severity of headaches and migraines I recommend further evaluation with MRI brain without contrast to rule out structural or treatable causes of symptoms   Preventative:  We discussed headache hygiene and lifestyle factors that may improve headaches  We agreed to hold off on a daily preventative medication at this time, however, patient most interested in topamax for future  Currently on through other providers: losartan, topamax  Past/ failed/contraindicated: propranolol (on losartan)  Future options:  amitriptyline, Topamax, CGRP med, botox  Acute:  Discussed not taking over-the-counter or prescription pain medications more than 3 days per week to prevent medication overuse/rebound headache  Start Fioricet to use sparingly as needed for headaches  Currently on through other providers: none  Past/ failed/contraindicated: triptans (hypertension), NSAIDs (HTN on losartan)  Future options:  ubrelvy, reyvow, nurtec  Rescue:  Toradol IM injection offered in office today-patient declined at this time  Bridge:  Prednisone taper sent to pharmacy to help break current headache cycle.  Take medication with food and avoid NSAID medications (which he already does)    Orders:  -     MRI brain without contrast; Future; Expected date: 09/10/2024  -     predniSONE 20 mg tablet; Take 3 tablets (60 mg total) by mouth daily for 2 days, THEN 2 tablets (40 mg total) daily for 2 days, THEN 1 tablet (20 mg total) daily for 2 days.  -     butalbital-acetaminophen-caffeine (FIORICET,ESGIC) -40 mg per tablet; Take 1 tablet by mouth every 6 (six) hours as needed for headaches or migraine Only 10 per month      Patient should follow up in 2 months, or I would be happy to see him sooner if needed.  Patient should call the office or send a [employer] message with any questions or concerns.  Patient should present to the nearest emergency department with any concerning symptoms.     History of Present Illness     We had the pleasure of evaluating Rajat in neurological consultation today. he is a 54 y.o. year-old male who presents today for evaluation of headaches.     He endorses history of ocular migraine where he would get the aura but would not get the head pain.  This occurred maybe 5 times in his lifetime.  He does report being more stressed over the past 6 months but not sure if it's related.  He does experience a persistent daily headache which started about 2 to 3 months ago which he describes as a dull ache in the left occiput which  travels to the vertex.  They do not appear to be positional in nature.  He does not have associated symptoms suspicious for migraine or cluster headache.  The scalp is not tender to the touch in fact applying pressure to the area makes it feel better.  The headaches are about a 3-4 out of 10 and are not terribly painful but are nagging.  He has not had any recent workup for neuroimaging.  He is not interested in starting a daily preventative medication at this time, however, his wife has chronic migraines and is treated with Topamax.  He avoids NSAID medications secondary to his hypertension on losartan.  He takes over-the-counter Tylenol when he experiences the head pain but it is minimally effective.    Differentials include: cervicogenic headache, tension headache, less likely: migraine or occipital neuralgia    Headaches started at what age?  54 years old (started approximately 2-3 months ago)  How often do the headaches occur? daily  What time of the day do the headaches start?  No particular time of day   How long do the headaches last? Hours to whole day  Are you ever headache free? Yes, very rarely.     Aura? without aura     Last eye exam: within the past year    Where is your headache located and pain quality? Left parietal area. Dull aching  What is the intensity of pain? Average: 3/10, worst 4/10    Associated symptoms:   [] Nausea       [] Vomiting        [] Diarrhea  [] Insomnia    [x] Stiff or sore neck   [] Problems with concentration  [] Photophobia     []Phonophobia      [] Osmophobia  [] Blurred vision   [] Prefer quiet, dark room  [] Light-headed or dizzy     [x] Tinnitus (chronic)  [] Hands or feet tingle or feel numb/paresthesias    [] Ptosis      [] Facial droop  [] Lacrimation  [] Nasal congestion/rhinorrhea   [] Flushing of face    Things that make the headache worse? Moving head back and forth.      Headache triggers:  none that he is aware of.     Have you seen someone else for headaches  or pain? No  Have you had trigger point injection performed and how often? No  Have you had Botox injection performed and how often? No   Have you had epidural injections or transforaminal injections performed? No  Have you ever had any Brain imaging? yes [facility] 2017    LIFESTYLE  Sleep   Averages: 8 hours on average  Problems falling asleep?:   No  Problems staying asleep?:  No  Do you snore while asleep?No  Do you wake up with headaches? Sometimes  Physical activity: routine physical activity  Water: >64oz per day  Caffeine: 2 cups per day per day  Mood: Feels stable at this time    Pertinent family history:  Family history of headaches:  no known family members with significant headaches  Any family history of aneurysms - No    Pertinent social history:  Work: Teacher at [employer]  Lives with lives with their family  Illicit Drugs: denies  Alcohol/tobacco: Denies alcohol use, Denies tobacco use     What medications do you take or have you taken for your headaches?:    ABORTIVE:    OTC medications: tylenol (ineffective)  Prescription: None  Medications from other providers: None  Past/failed/contraindicated: NSAIDs (on losartan)    PREVENTIVE:   OTC medications: None  Prescription: None  Medications from other providers: Losartan, Wellbutrin  Past/failed/contraindicated: None    Review of Systems:  Constitutional:  Negative for appetite change and fever.   HENT: Negative.  Negative for hearing loss, tinnitus, trouble swallowing and voice change.    Eyes: Negative.  Negative for photophobia and pain.   Respiratory: Negative.  Negative for shortness of breath.    Cardiovascular: Negative.  Negative for palpitations.   Gastrointestinal: Negative.  Negative for nausea and vomiting.   Endocrine: Negative.  Negative for cold intolerance.   Genitourinary: Negative.  Negative for dysuria, frequency and urgency.   Musculoskeletal: Negative.  Negative for myalgias and neck pain.   Skin: Negative.  Negative for rash.  "  Neurological:  Positive for headaches. Negative for dizziness, tremors, seizures, syncope, facial asymmetry, speech difficulty, weakness, light-headedness and numbness.   Hematological: Negative.  Does not bruise/bleed easily.   Psychiatric/Behavioral:  Negative for confusion, hallucinations and sleep disturbance.      Reviewed ROS as entered by medical assistant.     Current Outpatient Medications on File Prior to Visit   Medication Sig Dispense Refill    buPROPion (WELLBUTRIN XL) 150 mg 24 hr tablet Take 1 tablet by mouth every morning      losartan (COZAAR) 100 MG tablet       tadalafil (Cialis) 20 MG tablet Take 20 mg by mouth if needed      [DISCONTINUED] brompheniramine-pseudoephedrine-DM 30-2-10 MG/5ML syrup Take 10 mL by mouth 3 (three) times a day as needed for congestion or cough 150 mL 0    [DISCONTINUED] ondansetron (ZOFRAN) 4 mg tablet Take 4 mg by mouth daily as needed (Patient not taking: Reported on 5/1/2024)      [DISCONTINUED] sertraline (ZOLOFT) 25 mg tablet       [DISCONTINUED] sildenafil (VIAGRA) 100 mg tablet Take 100 mg by mouth (Patient not taking: Reported on 5/1/2024)       No current facility-administered medications on file prior to visit.      Social History     Tobacco Use    Smoking status: Never    Smokeless tobacco: Never   Vaping Use    Vaping status: Not on file   Substance and Sexual Activity    Alcohol use: No    Drug use: No    Sexual activity: Yes     Partners: Female     Birth control/protection: None     Objective     /91 (BP Location: Right arm, Patient Position: Sitting, Cuff Size: Standard)   Pulse 71   Temp 97.9 °F (36.6 °C) (Temporal)   Ht 6' 1\" (1.854 m)   Wt 89.4 kg (197 lb)   BMI 25.99 kg/m²     Pertinent Labs:  No recent labs to review    Pertinent Imaging/Test Results:  9/5/17: Unremarkable CT of the brain    Physical Exam  On neurological examination the patient was awake, alert, attentive, oriented to person, place, and time. Recent and remote memory " intact to conversation with no evidence of language dysfunction. Satisfactory fund of knowledge. Normal attention span and concentration.  Mood, affect and judgement are appropriate. Speech is fluent without dysarthria or aphasia. Face appears symmetric, with no obvious weakness noted.  Audition is intact to casual conversation.  Eye movements are intact.  Able to move bilateral upper extremities antigravity without difficulty.      Administrative Statements   I have spent a total time of 35 minutes in caring for this patient on the day of the visit/encounter including Diagnostic results, Prognosis, Risks and benefits of tx options, Instructions for management, Patient and family education, Importance of tx compliance, Risk factor reductions, Impressions, Counseling / Coordination of care, Documenting in the medical record, Reviewing / ordering tests, medicine, procedures  , and Obtaining or reviewing history  .

## 2024-09-10 ENCOUNTER — OFFICE VISIT (OUTPATIENT)
Dept: NEUROLOGY | Facility: CLINIC | Age: 54
End: 2024-09-10
Payer: COMMERCIAL

## 2024-09-10 VITALS
BODY MASS INDEX: 26.11 KG/M2 | HEIGHT: 73 IN | DIASTOLIC BLOOD PRESSURE: 91 MMHG | TEMPERATURE: 97.9 F | SYSTOLIC BLOOD PRESSURE: 129 MMHG | WEIGHT: 197 LBS | HEART RATE: 71 BPM

## 2024-09-10 DIAGNOSIS — G44.52 NEW PERSISTENT DAILY HEADACHE: Primary | ICD-10-CM

## 2024-09-10 PROCEDURE — 99204 OFFICE O/P NEW MOD 45 MIN: CPT

## 2024-09-10 RX ORDER — PREDNISONE 20 MG/1
TABLET ORAL
Qty: 12 TABLET | Refills: 0 | Status: SHIPPED | OUTPATIENT
Start: 2024-09-10 | End: 2024-09-16

## 2024-09-10 RX ORDER — BUTALBITAL, ACETAMINOPHEN AND CAFFEINE 50; 325; 40 MG/1; MG/1; MG/1
1 TABLET ORAL EVERY 6 HOURS PRN
Qty: 10 TABLET | Refills: 0 | Status: SHIPPED | OUTPATIENT
Start: 2024-09-10 | End: 2024-10-10

## 2024-09-10 RX ORDER — TADALAFIL 20 MG/1
20 TABLET ORAL AS NEEDED
COMMUNITY
Start: 2024-07-24

## 2024-09-10 NOTE — PROGRESS NOTES
Review of Systems   Constitutional:  Negative for appetite change and fever.   HENT: Negative.  Negative for hearing loss, tinnitus, trouble swallowing and voice change.    Eyes: Negative.  Negative for photophobia and pain.   Respiratory: Negative.  Negative for shortness of breath.    Cardiovascular: Negative.  Negative for palpitations.   Gastrointestinal: Negative.  Negative for nausea and vomiting.   Endocrine: Negative.  Negative for cold intolerance.   Genitourinary: Negative.  Negative for dysuria, frequency and urgency.   Musculoskeletal: Negative.  Negative for myalgias and neck pain.   Skin: Negative.  Negative for rash.   Neurological:  Positive for headaches. Negative for dizziness, tremors, seizures, syncope, facial asymmetry, speech difficulty, weakness, light-headedness and numbness.   Hematological: Negative.  Does not bruise/bleed easily.   Psychiatric/Behavioral:  Negative for confusion, hallucinations and sleep disturbance.

## 2024-09-10 NOTE — ASSESSMENT & PLAN NOTE
He endorses history of ocular migraine where he would get the aura but would not get the head pain.  This occurred maybe 5 times in his lifetime.  He does report being more stressed over the past 6 months but not sure if it's related.  He does experience a persistent daily headache which started about 2 to 3 months ago which he describes as a dull ache in the left occiput which travels to the vertex.  They do not appear to be positional in nature.  He does not have associated symptoms suspicious for migraine or cluster headache.  The scalp is not tender to the touch in fact applying pressure to the area makes it feel better.  The headaches are about a 3-4 out of 10 and are not terribly painful but are nagging.  He has not had any recent workup for neuroimaging.  He is not interested in starting a daily preventative medication at this time, however, his wife has chronic migraines and is treated with Topamax.  He avoids NSAID medications secondary to his hypertension on losartan.  He takes over-the-counter Tylenol when he experiences the head pain but it is minimally effective.  Workup:  Due to increased frequency and severity of headaches and migraines I recommend further evaluation with MRI brain without contrast to rule out structural or treatable causes of symptoms   Preventative:  We discussed headache hygiene and lifestyle factors that may improve headaches  We agreed to hold off on a daily preventative medication at this time, however, patient most interested in topamax for future  Currently on through other providers: losartan, topamax  Past/ failed/contraindicated: propranolol (on losartan)  Future options: amitriptyline, Topamax, CGRP med, botox  Acute:  Discussed not taking over-the-counter or prescription pain medications more than 3 days per week to prevent medication overuse/rebound headache  Start Fioricet to use sparingly as needed for headaches  Currently on through other providers: none  Past/  failed/contraindicated: triptans (hypertension), NSAIDs (HTN on losartan)  Future options:  ubrelvy, reyvow, nurtec  Rescue:  Toradol IM injection offered in office today-patient declined at this time  Bridge:  Prednisone taper sent to pharmacy to help break current headache cycle.  Take medication with food and avoid NSAID medications (which he already does)

## 2024-09-24 ENCOUNTER — HOSPITAL ENCOUNTER (OUTPATIENT)
Dept: RADIOLOGY | Facility: HOSPITAL | Age: 54
Discharge: HOME | End: 2024-09-24
Payer: COMMERCIAL

## 2024-09-24 DIAGNOSIS — G44.52 NEW PERSISTENT DAILY HEADACHE: ICD-10-CM

## 2024-09-24 PROCEDURE — 70551 MRI BRAIN STEM W/O DYE: CPT

## 2024-09-25 NOTE — RESULT ENCOUNTER NOTE
Allan Earl,    The MRI of your brain came back and I was able to review the imaging and report.  There is no evidence of any stroke, swelling, cysts, or other tumors.  It does show evidence of mild chronic microangiopathic changes, which can be commonly seen in patients who have migraines.  It can also be from uncontrolled risk factors like high blood pressure, high cholesterol, diabetes.  We can discuss this further in the office when you follow-up.    Please let me know if you have any additional questions or concerns.    Jose,    Amanda

## 2025-02-01 PROBLEM — R10.13 EPIGASTRIC PAIN: Status: RESOLVED | Noted: 2021-12-27 | Resolved: 2025-02-01

## 2025-02-01 PROBLEM — N48.6 PEYRONIE DISEASE: Status: ACTIVE | Noted: 2018-04-26

## 2025-02-01 PROBLEM — R94.31 ABNORMAL EKG: Status: ACTIVE | Noted: 2021-11-03

## 2025-02-01 PROBLEM — I10 HYPERTENSION, ESSENTIAL: Status: ACTIVE | Noted: 2022-11-02

## 2025-02-01 PROBLEM — G47.39 OTHER SLEEP APNEA: Status: ACTIVE | Noted: 2017-11-03

## 2025-02-01 PROBLEM — H53.419: Status: ACTIVE | Noted: 2017-12-12

## 2025-02-01 PROBLEM — R07.9 CHEST PAIN: Status: RESOLVED | Noted: 2021-08-12 | Resolved: 2025-02-01

## 2025-02-01 PROBLEM — R94.31 EKG ABNORMALITIES: Status: RESOLVED | Noted: 2021-08-12 | Resolved: 2025-02-01

## 2025-03-31 ENCOUNTER — TELEPHONE (OUTPATIENT)
Dept: SLEEP CENTER | Facility: CLINIC | Age: 55
End: 2025-03-31

## 2025-03-31 NOTE — TELEPHONE ENCOUNTER
DME script for patient from [facility].  Script faxed back with message patient last office visit with Dr. Turcios 5/17/2023.     Call to patient to inform of the same, voicemail message left along with number to schedule an appointment with Dr. Turcios.     Script in media and faxed back to [facility] with message.